# Patient Record
Sex: MALE | Race: WHITE | NOT HISPANIC OR LATINO | Employment: FULL TIME | URBAN - METROPOLITAN AREA
[De-identification: names, ages, dates, MRNs, and addresses within clinical notes are randomized per-mention and may not be internally consistent; named-entity substitution may affect disease eponyms.]

---

## 2024-02-20 ENCOUNTER — OFFICE VISIT (OUTPATIENT)
Dept: ORTHOPEDIC SURGERY | Facility: CLINIC | Age: 59
End: 2024-02-20
Payer: MEDICAID

## 2024-02-20 ENCOUNTER — HOSPITAL ENCOUNTER (OUTPATIENT)
Dept: RADIOLOGY | Facility: CLINIC | Age: 59
Discharge: HOME | End: 2024-02-20
Payer: MEDICAID

## 2024-02-20 DIAGNOSIS — M23.305 DEGENERATION OF MEDIAL MENISCUS: Primary | ICD-10-CM

## 2024-02-20 DIAGNOSIS — M25.561 CHRONIC PAIN OF RIGHT KNEE: ICD-10-CM

## 2024-02-20 DIAGNOSIS — G89.29 CHRONIC PAIN OF RIGHT KNEE: ICD-10-CM

## 2024-02-20 PROBLEM — S83.241A OTHER TEAR OF MEDIAL MENISCUS, CURRENT INJURY, RIGHT KNEE, INITIAL ENCOUNTER: Status: ACTIVE | Noted: 2024-02-20

## 2024-02-20 PROBLEM — S83.281A OTHER TEAR OF LATERAL MENISCUS, CURRENT INJURY, RIGHT KNEE, INITIAL ENCOUNTER: Status: ACTIVE | Noted: 2024-02-20

## 2024-02-20 PROCEDURE — 99214 OFFICE O/P EST MOD 30 MIN: CPT | Performed by: ORTHOPAEDIC SURGERY

## 2024-02-20 PROCEDURE — 73564 X-RAY EXAM KNEE 4 OR MORE: CPT | Mod: RT

## 2024-02-20 PROCEDURE — 73564 X-RAY EXAM KNEE 4 OR MORE: CPT | Mod: RIGHT SIDE | Performed by: ORTHOPAEDIC SURGERY

## 2024-02-20 NOTE — PROGRESS NOTES
History of present illness    58-year-old gentleman I seen in the remote past no significant right knee meniscal tearing has been having intermittent episodes of locking catching giving way over the last few weeks his symptoms be getting progressively worse he is having difficulty bearing weight difficulty stand for prolonged period time he has had an MRI in the past.  He is having more more swelling and disability related to his knee.      Past medical , Surgical, Family and social history reviewed.      Physical exam  General: No acute distress and breathing comfortably.  Patient is pleasant and cooperative with the examination.    Extremity  The affected knee was examined and inspected and was tender to the touch along the medial aspect with catching locking and mechanical symptoms.  The skin was intact without breakdown or open wound.  There was a positive Juan Ramon exam seen without gross evidence of instability in the collateral ligaments or in the anterior or posterior planes.  There was a negative Lachman's pivot shift and posterior drawer test with no foot drop numbness or tingling.  Sensation and reflexes in the foot and ankle are preserved.  There was an effusion. The patient had the ability to bear weight, but with discomfort.  The patient's gait was antalgic secondary to the discomfort. Range of motion showed good straight leg raise with ROM 0-115    Diagnostics      XR knee right 4+ views    Result Date: 2/20/2024  Interpreted By:  Dylan Sam, STUDY: XR KNEE RIGHT 4+ VIEWS;  ;  2/20/2024 9:01 am   INDICATION: Signs/Symptoms:pain.   ACCESSION NUMBER(S): [id number]   ORDERING CLINICIAN: DYLAN SAM   FINDINGS: Right knee weightbearing four views. Moderate lateral tilt of the patella with osteophyte formation mild knee arthritic changes with joint space narrowing mild knee effusion no signs of fracture dislocation or other bony abnormality     Signed by: Dylan  Flaco 2/20/2024 9:13 AM Dictation workstation:   [id number]       Procedure  [ none]    Assessment  Meniscal tearing knee right    Treatment plan  1.  The natural history of the condition and its associated treatment alternatives including surgical and nonsurgical options were discussed with the patient at length.  2.  Patient had previous MRI was diagnosed with medial and lateral meniscal tearing is try to hold off for as long as possible but symptoms seem to be getting progressively worse he is having significant impairment he like to proceed with knee arthroscopy.  The risk benefits treatment alternatives and postop course were discussed and he understands wishes to proceed.  3. [   ]  4.  All of the patient's questions were answered.    Orders Placed This Encounter    XR knee right 4+ views       This note was prepared using voice recognition software.  The details of this note are correct and have been reviewed, and corrected to the best of my ability.  Some grammatical areas may persist related to the Dragon software    Jamin Sam MD  Senior Attending Physician  [facility]  [facility] [place]    [phone]

## 2024-02-23 ENCOUNTER — HOSPITAL ENCOUNTER (OUTPATIENT)
Dept: CARDIOLOGY | Facility: HOSPITAL | Age: 59
Discharge: HOME | End: 2024-02-23
Payer: MEDICAID

## 2024-02-23 ENCOUNTER — APPOINTMENT (OUTPATIENT)
Dept: CARDIOLOGY | Facility: HOSPITAL | Age: 59
End: 2024-02-23
Payer: MEDICAID

## 2024-02-23 ENCOUNTER — LAB (OUTPATIENT)
Dept: LAB | Facility: HOSPITAL | Age: 59
End: 2024-02-23
Payer: MEDICAID

## 2024-02-23 DIAGNOSIS — S83.281A OTHER TEAR OF LATERAL MENISCUS, CURRENT INJURY, RIGHT KNEE, INITIAL ENCOUNTER: ICD-10-CM

## 2024-02-23 DIAGNOSIS — S83.241A OTHER TEAR OF MEDIAL MENISCUS, CURRENT INJURY, RIGHT KNEE, INITIAL ENCOUNTER: ICD-10-CM

## 2024-02-23 LAB
ALBUMIN SERPL BCP-MCNC: 4.2 G/DL (ref 3.4–5)
ALP SERPL-CCNC: 78 U/L (ref 33–120)
ALT SERPL W P-5'-P-CCNC: 20 U/L (ref 10–52)
ANION GAP SERPL CALC-SCNC: 9 MMOL/L (ref 10–20)
AST SERPL W P-5'-P-CCNC: 14 U/L (ref 9–39)
BASOPHILS # BLD AUTO: 0.06 X10*3/UL (ref 0–0.1)
BASOPHILS NFR BLD AUTO: 0.7 %
BILIRUB SERPL-MCNC: 0.3 MG/DL (ref 0–1.2)
BUN SERPL-MCNC: 16 MG/DL (ref 6–23)
CALCIUM SERPL-MCNC: 9.2 MG/DL (ref 8.6–10.3)
CHLORIDE SERPL-SCNC: 105 MMOL/L (ref 98–107)
CO2 SERPL-SCNC: 28 MMOL/L (ref 21–32)
CREAT SERPL-MCNC: 0.81 MG/DL (ref 0.5–1.3)
EGFRCR SERPLBLD CKD-EPI 2021: >90 ML/MIN/1.73M*2
EOSINOPHIL # BLD AUTO: 0.6 X10*3/UL (ref 0–0.7)
EOSINOPHIL NFR BLD AUTO: 7 %
ERYTHROCYTE [DISTWIDTH] IN BLOOD BY AUTOMATED COUNT: 13.2 % (ref 11.5–14.5)
GLUCOSE SERPL-MCNC: 96 MG/DL (ref 74–99)
HCT VFR BLD AUTO: 43.7 % (ref 41–52)
HGB BLD-MCNC: 14.4 G/DL (ref 13.5–17.5)
IMM GRANULOCYTES # BLD AUTO: 0.02 X10*3/UL (ref 0–0.7)
IMM GRANULOCYTES NFR BLD AUTO: 0.2 % (ref 0–0.9)
INR PPP: 0.9 (ref 0.9–1.1)
LYMPHOCYTES # BLD AUTO: 2.54 X10*3/UL (ref 1.2–4.8)
LYMPHOCYTES NFR BLD AUTO: 29.5 %
MCH RBC QN AUTO: 33 PG (ref 26–34)
MCHC RBC AUTO-ENTMCNC: 33 G/DL (ref 32–36)
MCV RBC AUTO: 100 FL (ref 80–100)
MONOCYTES # BLD AUTO: 0.73 X10*3/UL (ref 0.1–1)
MONOCYTES NFR BLD AUTO: 8.5 %
NEUTROPHILS # BLD AUTO: 4.66 X10*3/UL (ref 1.2–7.7)
NEUTROPHILS NFR BLD AUTO: 54.1 %
NRBC BLD-RTO: 0 /100 WBCS (ref 0–0)
PLATELET # BLD AUTO: 356 X10*3/UL (ref 150–450)
POTASSIUM SERPL-SCNC: 5 MMOL/L (ref 3.5–5.3)
PROT SERPL-MCNC: 7.2 G/DL (ref 6.4–8.2)
PROTHROMBIN TIME: 10.2 SECONDS (ref 9.8–12.8)
RBC # BLD AUTO: 4.36 X10*6/UL (ref 4.5–5.9)
SODIUM SERPL-SCNC: 137 MMOL/L (ref 136–145)
WBC # BLD AUTO: 8.6 X10*3/UL (ref 4.4–11.3)

## 2024-02-23 PROCEDURE — 80053 COMPREHEN METABOLIC PANEL: CPT

## 2024-02-23 PROCEDURE — 93010 ELECTROCARDIOGRAM REPORT: CPT | Performed by: INTERNAL MEDICINE

## 2024-02-23 PROCEDURE — 93005 ELECTROCARDIOGRAM TRACING: CPT

## 2024-02-23 PROCEDURE — 36415 COLL VENOUS BLD VENIPUNCTURE: CPT

## 2024-02-23 PROCEDURE — 85025 COMPLETE CBC W/AUTO DIFF WBC: CPT

## 2024-02-23 PROCEDURE — 85610 PROTHROMBIN TIME: CPT

## 2024-02-27 LAB
ATRIAL RATE: 61 BPM
P AXIS: 54 DEGREES
P OFFSET: 186 MS
P ONSET: 138 MS
PR INTERVAL: 170 MS
Q ONSET: 223 MS
QRS COUNT: 10 BEATS
QRS DURATION: 86 MS
QT INTERVAL: 392 MS
QTC CALCULATION(BAZETT): 394 MS
QTC FREDERICIA: 393 MS
R AXIS: 54 DEGREES
T AXIS: 51 DEGREES
T OFFSET: 419 MS
VENTRICULAR RATE: 61 BPM

## 2024-03-01 DIAGNOSIS — M23.305 DEGENERATION OF MEDIAL MENISCUS: ICD-10-CM

## 2024-03-01 DIAGNOSIS — G89.29 CHRONIC PAIN OF RIGHT KNEE: ICD-10-CM

## 2024-03-01 DIAGNOSIS — M25.561 CHRONIC PAIN OF RIGHT KNEE: ICD-10-CM

## 2024-03-01 PROBLEM — M25.569 KNEE PAIN: Status: ACTIVE | Noted: 2024-03-01

## 2024-03-01 PROBLEM — M23.90 INTERNAL DERANGEMENT OF KNEE: Status: RESOLVED | Noted: 2024-03-01 | Resolved: 2024-03-01

## 2024-03-01 RX ORDER — IBUPROFEN 800 MG/1
800 TABLET ORAL DAILY
COMMUNITY

## 2024-03-01 RX ORDER — BISMUTH SUBSALICYLATE 262 MG
1 TABLET,CHEWABLE ORAL DAILY
COMMUNITY

## 2024-03-01 NOTE — PREPROCEDURE INSTRUCTIONS
Reviewed medical history and current medications. Aware to hold ibuprofen. NPO after midnight. Patient verbalized understanding

## 2024-03-02 RX ORDER — OXYCODONE AND ACETAMINOPHEN 5; 325 MG/1; MG/1
1 TABLET ORAL EVERY 8 HOURS PRN
Qty: 20 TABLET | Refills: 0 | Status: SHIPPED | OUTPATIENT
Start: 2024-03-04 | End: 2024-03-11

## 2024-03-03 ASSESSMENT — ENCOUNTER SYMPTOMS
ARTHRALGIAS: 0
EYES NEGATIVE: 1
CONSTITUTIONAL NEGATIVE: 1
JOINT SWELLING: 0
CHEST TIGHTNESS: 0
CARDIOVASCULAR NEGATIVE: 1

## 2024-03-04 ENCOUNTER — ANESTHESIA (OUTPATIENT)
Dept: OPERATING ROOM | Facility: HOSPITAL | Age: 59
End: 2024-03-04
Payer: MEDICAID

## 2024-03-04 ENCOUNTER — ANESTHESIA EVENT (OUTPATIENT)
Dept: OPERATING ROOM | Facility: HOSPITAL | Age: 59
End: 2024-03-04
Payer: MEDICAID

## 2024-03-04 ENCOUNTER — HOSPITAL ENCOUNTER (OUTPATIENT)
Facility: HOSPITAL | Age: 59
Setting detail: OUTPATIENT SURGERY
Discharge: HOME | End: 2024-03-04
Attending: ORTHOPAEDIC SURGERY | Admitting: ORTHOPAEDIC SURGERY
Payer: MEDICAID

## 2024-03-04 VITALS
SYSTOLIC BLOOD PRESSURE: 114 MMHG | HEART RATE: 60 BPM | RESPIRATION RATE: 18 BRPM | BODY MASS INDEX: 33.89 KG/M2 | WEIGHT: 242.06 LBS | OXYGEN SATURATION: 95 % | TEMPERATURE: 96.8 F | DIASTOLIC BLOOD PRESSURE: 60 MMHG | HEIGHT: 71 IN

## 2024-03-04 DIAGNOSIS — S83.241A OTHER TEAR OF MEDIAL MENISCUS, CURRENT INJURY, RIGHT KNEE, INITIAL ENCOUNTER: Primary | ICD-10-CM

## 2024-03-04 DIAGNOSIS — S83.281A OTHER TEAR OF LATERAL MENISCUS, CURRENT INJURY, RIGHT KNEE, INITIAL ENCOUNTER: ICD-10-CM

## 2024-03-04 PROCEDURE — 2500000001 HC RX 250 WO HCPCS SELF ADMINISTERED DRUGS (ALT 637 FOR MEDICARE OP): Performed by: ANESTHESIOLOGY

## 2024-03-04 PROCEDURE — A4217 STERILE WATER/SALINE, 500 ML: HCPCS | Performed by: ORTHOPAEDIC SURGERY

## 2024-03-04 PROCEDURE — 7100000002 HC RECOVERY ROOM TIME - EACH INCREMENTAL 1 MINUTE: Performed by: ORTHOPAEDIC SURGERY

## 2024-03-04 PROCEDURE — 29880 ARTHRS KNE SRG MNISECTMY M&L: CPT | Performed by: ORTHOPAEDIC SURGERY

## 2024-03-04 PROCEDURE — 7100000009 HC PHASE TWO TIME - INITIAL BASE CHARGE: Performed by: ORTHOPAEDIC SURGERY

## 2024-03-04 PROCEDURE — 7100000001 HC RECOVERY ROOM TIME - INITIAL BASE CHARGE: Performed by: ORTHOPAEDIC SURGERY

## 2024-03-04 PROCEDURE — 2500000004 HC RX 250 GENERAL PHARMACY W/ HCPCS (ALT 636 FOR OP/ED): Performed by: ANESTHESIOLOGY

## 2024-03-04 PROCEDURE — 2500000005 HC RX 250 GENERAL PHARMACY W/O HCPCS: Performed by: ORTHOPAEDIC SURGERY

## 2024-03-04 PROCEDURE — 2500000005 HC RX 250 GENERAL PHARMACY W/O HCPCS

## 2024-03-04 PROCEDURE — 2500000004 HC RX 250 GENERAL PHARMACY W/ HCPCS (ALT 636 FOR OP/ED): Performed by: ORTHOPAEDIC SURGERY

## 2024-03-04 PROCEDURE — 3700000001 HC GENERAL ANESTHESIA TIME - INITIAL BASE CHARGE: Performed by: ORTHOPAEDIC SURGERY

## 2024-03-04 PROCEDURE — 3600000004 HC OR TIME - INITIAL BASE CHARGE - PROCEDURE LEVEL FOUR: Performed by: ORTHOPAEDIC SURGERY

## 2024-03-04 PROCEDURE — 2500000004 HC RX 250 GENERAL PHARMACY W/ HCPCS (ALT 636 FOR OP/ED)

## 2024-03-04 PROCEDURE — 2720000007 HC OR 272 NO HCPCS: Performed by: ORTHOPAEDIC SURGERY

## 2024-03-04 PROCEDURE — 7100000010 HC PHASE TWO TIME - EACH INCREMENTAL 1 MINUTE: Performed by: ORTHOPAEDIC SURGERY

## 2024-03-04 PROCEDURE — 3600000009 HC OR TIME - EACH INCREMENTAL 1 MINUTE - PROCEDURE LEVEL FOUR: Performed by: ORTHOPAEDIC SURGERY

## 2024-03-04 PROCEDURE — 3700000002 HC GENERAL ANESTHESIA TIME - EACH INCREMENTAL 1 MINUTE: Performed by: ORTHOPAEDIC SURGERY

## 2024-03-04 RX ORDER — BUPIVACAINE HYDROCHLORIDE 5 MG/ML
INJECTION, SOLUTION PERINEURAL AS NEEDED
Status: DISCONTINUED | OUTPATIENT
Start: 2024-03-04 | End: 2024-03-04 | Stop reason: HOSPADM

## 2024-03-04 RX ORDER — ACETAMINOPHEN 325 MG/1
650 TABLET ORAL EVERY 4 HOURS PRN
Status: DISCONTINUED | OUTPATIENT
Start: 2024-03-04 | End: 2024-03-04 | Stop reason: HOSPADM

## 2024-03-04 RX ORDER — MIDAZOLAM HYDROCHLORIDE 1 MG/ML
INJECTION, SOLUTION INTRAMUSCULAR; INTRAVENOUS AS NEEDED
Status: DISCONTINUED | OUTPATIENT
Start: 2024-03-04 | End: 2024-03-04

## 2024-03-04 RX ORDER — FENTANYL CITRATE 50 UG/ML
INJECTION, SOLUTION INTRAMUSCULAR; INTRAVENOUS AS NEEDED
Status: DISCONTINUED | OUTPATIENT
Start: 2024-03-04 | End: 2024-03-04

## 2024-03-04 RX ORDER — OXYCODONE HYDROCHLORIDE 5 MG/1
10 TABLET ORAL EVERY 4 HOURS PRN
Status: DISCONTINUED | OUTPATIENT
Start: 2024-03-04 | End: 2024-03-04 | Stop reason: HOSPADM

## 2024-03-04 RX ORDER — DEXAMETHASONE SODIUM PHOSPHATE 4 MG/ML
INJECTION, SOLUTION INTRA-ARTICULAR; INTRALESIONAL; INTRAMUSCULAR; INTRAVENOUS; SOFT TISSUE AS NEEDED
Status: DISCONTINUED | OUTPATIENT
Start: 2024-03-04 | End: 2024-03-04

## 2024-03-04 RX ORDER — PROPOFOL 10 MG/ML
INJECTION, EMULSION INTRAVENOUS AS NEEDED
Status: DISCONTINUED | OUTPATIENT
Start: 2024-03-04 | End: 2024-03-04

## 2024-03-04 RX ORDER — FENTANYL CITRATE 50 UG/ML
50 INJECTION, SOLUTION INTRAMUSCULAR; INTRAVENOUS EVERY 5 MIN PRN
Status: DISCONTINUED | OUTPATIENT
Start: 2024-03-04 | End: 2024-03-04 | Stop reason: HOSPADM

## 2024-03-04 RX ORDER — KETOROLAC TROMETHAMINE 30 MG/ML
INJECTION, SOLUTION INTRAMUSCULAR; INTRAVENOUS AS NEEDED
Status: DISCONTINUED | OUTPATIENT
Start: 2024-03-04 | End: 2024-03-04

## 2024-03-04 RX ORDER — ONDANSETRON HYDROCHLORIDE 2 MG/ML
INJECTION, SOLUTION INTRAVENOUS AS NEEDED
Status: DISCONTINUED | OUTPATIENT
Start: 2024-03-04 | End: 2024-03-04

## 2024-03-04 RX ORDER — SODIUM CHLORIDE 0.9 G/100ML
IRRIGANT IRRIGATION AS NEEDED
Status: DISCONTINUED | OUTPATIENT
Start: 2024-03-04 | End: 2024-03-04 | Stop reason: HOSPADM

## 2024-03-04 RX ORDER — MEPERIDINE HYDROCHLORIDE 25 MG/ML
12.5 INJECTION INTRAMUSCULAR; INTRAVENOUS; SUBCUTANEOUS EVERY 10 MIN PRN
Status: DISCONTINUED | OUTPATIENT
Start: 2024-03-04 | End: 2024-03-04 | Stop reason: HOSPADM

## 2024-03-04 RX ORDER — CEFAZOLIN SODIUM 2 G/100ML
2 INJECTION, SOLUTION INTRAVENOUS ONCE
Status: COMPLETED | OUTPATIENT
Start: 2024-03-04 | End: 2024-03-04

## 2024-03-04 RX ORDER — SODIUM CHLORIDE, SODIUM LACTATE, POTASSIUM CHLORIDE, CALCIUM CHLORIDE 600; 310; 30; 20 MG/100ML; MG/100ML; MG/100ML; MG/100ML
100 INJECTION, SOLUTION INTRAVENOUS CONTINUOUS
Status: DISCONTINUED | OUTPATIENT
Start: 2024-03-04 | End: 2024-03-04 | Stop reason: HOSPADM

## 2024-03-04 RX ORDER — SODIUM CHLORIDE, SODIUM LACTATE, POTASSIUM CHLORIDE, CALCIUM CHLORIDE 600; 310; 30; 20 MG/100ML; MG/100ML; MG/100ML; MG/100ML
50 INJECTION, SOLUTION INTRAVENOUS CONTINUOUS
Status: DISCONTINUED | OUTPATIENT
Start: 2024-03-04 | End: 2024-03-04 | Stop reason: HOSPADM

## 2024-03-04 RX ORDER — FENTANYL CITRATE 50 UG/ML
25 INJECTION, SOLUTION INTRAMUSCULAR; INTRAVENOUS EVERY 5 MIN PRN
Status: DISCONTINUED | OUTPATIENT
Start: 2024-03-04 | End: 2024-03-04 | Stop reason: HOSPADM

## 2024-03-04 RX ORDER — LIDOCAINE HYDROCHLORIDE 20 MG/ML
INJECTION, SOLUTION INFILTRATION; PERINEURAL AS NEEDED
Status: DISCONTINUED | OUTPATIENT
Start: 2024-03-04 | End: 2024-03-04

## 2024-03-04 RX ORDER — OXYCODONE HYDROCHLORIDE 5 MG/1
5 TABLET ORAL EVERY 4 HOURS PRN
Status: DISCONTINUED | OUTPATIENT
Start: 2024-03-04 | End: 2024-03-04 | Stop reason: HOSPADM

## 2024-03-04 RX ADMIN — MIDAZOLAM 2 MG: 1 INJECTION INTRAMUSCULAR; INTRAVENOUS at 07:30

## 2024-03-04 RX ADMIN — FENTANYL CITRATE 25 MCG: 50 INJECTION, SOLUTION INTRAMUSCULAR; INTRAVENOUS at 08:50

## 2024-03-04 RX ADMIN — DEXAMETHASONE SODIUM PHOSPHATE 4 MG: 4 INJECTION, SOLUTION INTRAMUSCULAR; INTRAVENOUS at 07:39

## 2024-03-04 RX ADMIN — FENTANYL CITRATE 25 MCG: 50 INJECTION, SOLUTION INTRAMUSCULAR; INTRAVENOUS at 07:53

## 2024-03-04 RX ADMIN — CEFAZOLIN SODIUM 2 G: 2 INJECTION, SOLUTION INTRAVENOUS at 07:38

## 2024-03-04 RX ADMIN — ONDANSETRON 4 MG: 2 INJECTION, SOLUTION INTRAMUSCULAR; INTRAVENOUS at 07:39

## 2024-03-04 RX ADMIN — FENTANYL CITRATE 25 MCG: 50 INJECTION, SOLUTION INTRAMUSCULAR; INTRAVENOUS at 08:38

## 2024-03-04 RX ADMIN — FENTANYL CITRATE 25 MCG: 50 INJECTION, SOLUTION INTRAMUSCULAR; INTRAVENOUS at 08:58

## 2024-03-04 RX ADMIN — FENTANYL CITRATE 25 MCG: 50 INJECTION, SOLUTION INTRAMUSCULAR; INTRAVENOUS at 07:34

## 2024-03-04 RX ADMIN — KETOROLAC TROMETHAMINE 30 MG: 30 INJECTION, SOLUTION INTRAMUSCULAR; INTRAVENOUS at 07:56

## 2024-03-04 RX ADMIN — FENTANYL CITRATE 25 MCG: 50 INJECTION, SOLUTION INTRAMUSCULAR; INTRAVENOUS at 08:43

## 2024-03-04 RX ADMIN — PROPOFOL 200 MG: 10 INJECTION, EMULSION INTRAVENOUS at 07:34

## 2024-03-04 RX ADMIN — LIDOCAINE HYDROCHLORIDE 100 MG: 20 INJECTION, SOLUTION INFILTRATION; PERINEURAL at 07:34

## 2024-03-04 RX ADMIN — SODIUM CHLORIDE, POTASSIUM CHLORIDE, SODIUM LACTATE AND CALCIUM CHLORIDE 50 ML/HR: 600; 310; 30; 20 INJECTION, SOLUTION INTRAVENOUS at 06:18

## 2024-03-04 RX ADMIN — OXYCODONE HYDROCHLORIDE 10 MG: 5 TABLET ORAL at 09:23

## 2024-03-04 ASSESSMENT — PAIN - FUNCTIONAL ASSESSMENT
PAIN_FUNCTIONAL_ASSESSMENT: 0-10
PAIN_FUNCTIONAL_ASSESSMENT: UNABLE TO SELF-REPORT
PAIN_FUNCTIONAL_ASSESSMENT: 0-10
PAIN_FUNCTIONAL_ASSESSMENT: 0-10

## 2024-03-04 ASSESSMENT — PAIN DESCRIPTION - ORIENTATION: ORIENTATION: RIGHT

## 2024-03-04 ASSESSMENT — PAIN SCALES - GENERAL
PAINLEVEL_OUTOF10: 0 - NO PAIN
PAINLEVEL_OUTOF10: 0 - NO PAIN
PAINLEVEL_OUTOF10: 5 - MODERATE PAIN
PAINLEVEL_OUTOF10: 5 - MODERATE PAIN
PAINLEVEL_OUTOF10: 0 - NO PAIN
PAINLEVEL_OUTOF10: 7
PAINLEVEL_OUTOF10: 0 - NO PAIN
PAINLEVEL_OUTOF10: 6
PAINLEVEL_OUTOF10: 3

## 2024-03-04 ASSESSMENT — PAIN DESCRIPTION - LOCATION: LOCATION: KNEE

## 2024-03-04 ASSESSMENT — COLUMBIA-SUICIDE SEVERITY RATING SCALE - C-SSRS
2. HAVE YOU ACTUALLY HAD ANY THOUGHTS OF KILLING YOURSELF?: NO
1. IN THE PAST MONTH, HAVE YOU WISHED YOU WERE DEAD OR WISHED YOU COULD GO TO SLEEP AND NOT WAKE UP?: NO
6. HAVE YOU EVER DONE ANYTHING, STARTED TO DO ANYTHING, OR PREPARED TO DO ANYTHING TO END YOUR LIFE?: NO

## 2024-03-04 ASSESSMENT — PAIN SCALES - WONG BAKER: WONGBAKER_NUMERICALRESPONSE: HURTS WHOLE LOT

## 2024-03-04 NOTE — ANESTHESIA PREPROCEDURE EVALUATION
"Hari Gould is a 58 y.o. male here for:      Meniscectomy Arthroscopy Knee  With Jamin Sam MD  Pre-Op Diagnosis Codes:     * Other tear of medial meniscus, current injury, right knee, initial encounter [S83.241A]     * Other tear of lateral meniscus, current injury, right knee, initial encounter [S83.281A]    Lab Results   Component Value Date    HGB 14.4 02/23/2024    HCT 43.7 02/23/2024    WBC 8.6 02/23/2024     02/23/2024     02/23/2024    K 5.0 02/23/2024     02/23/2024    CREATININE 0.81 02/23/2024    BUN 16 02/23/2024       Social History     Substance and Sexual Activity   Drug Use Never      Tobacco Use: High Risk (3/4/2024)    Patient History     Smoking Tobacco Use: Every Day     Smokeless Tobacco Use: Current     Passive Exposure: Not on file      Social History     Substance and Sexual Activity   Alcohol Use Yes    Comment: rarely        No Known Allergies    Current Outpatient Medications   Medication Instructions    ibuprofen 800 mg, oral, Daily    multivitamin tablet 1 tablet, oral, Daily    oxyCODONE-acetaminophen (Percocet) 5-325 mg tablet 1 tablet, oral, Every 8 hours PRN       Past Medical History:   Diagnosis Date    Arthritis     Back pain     COPD (chronic obstructive pulmonary disease) (CMS/[facility])     Snores     Unspecified abnormalities of breathing 08/11/2020    Breathing problem    Unspecified visual loss 08/11/2020    Vision problems    Wears dentures     upper denture    Wears glasses     reading       Past Surgical History:   Procedure Laterality Date    APPENDECTOMY      CATARACT EXTRACTION, BILATERAL      HERNIA REPAIR      MULTIPLE TOOTH EXTRACTIONS         No family history on file.    Relevant Problems   No relevant active problems       Visit Vitals  /78   Temp 36.3 °C (97.3 °F) (Temporal)   Resp 18   Ht 1.803 m (5' 11\")   Wt 110 kg (242 lb 1 oz)   SpO2 95%   BMI 33.76 kg/m²   Smoking Status Every Day   BSA 2.35 m²       NPO Details:  NPO/Void " Status  Carbohydrate Drink Given Prior to Surgery? : N  Date of Last Liquid: 03/03/24  Time of Last Liquid: 2200  Date of Last Solid: 03/03/24  Time of Last Solid: 2000  Last Intake Type: Clear fluids  Time of Last Void: 0621        Physical Exam    Airway  Mallampati: II  TM distance: >3 FB     Cardiovascular - normal exam     Dental   (+) lower dentures, upper dentures     Pulmonary - normal exam     Abdominal   (+) obese  Abdomen: soft             Anesthesia Plan    History of general anesthesia?: yes  History of complications of general anesthesia?: no    ASA 3     general     intravenous induction   Anesthetic plan and risks discussed with patient.    Plan discussed with CRNA.

## 2024-03-04 NOTE — ANESTHESIA POSTPROCEDURE EVALUATION
Patient: Hari Gould    Procedure Summary       Date: 03/04/24 Room / Location: [facility] / Virtual [facility]    Anesthesia Start: 0730 Anesthesia Stop: 0808    Procedure: Meniscectomy Arthroscopy Knee (Right: Knee) Diagnosis:       Other tear of medial meniscus, current injury, right knee, initial encounter      Other tear of lateral meniscus, current injury, right knee, initial encounter      (Other tear of medial meniscus, current injury, right knee, initial encounter [S83.241A])      (Other tear of lateral meniscus, current injury, right knee, initial encounter [S83.281A])    Surgeons: Jamin Sam MD Responsible Provider: Sam Gibson MD    Anesthesia Type: general ASA Status: 3            Anesthesia Type: general    Vitals Value Taken Time   /67 03/04/24 0807   Temp 36.1 03/04/24 0810   Pulse 66 03/04/24 0808   Resp 17 03/04/24 0808   SpO2 99 % 03/04/24 0808   Vitals shown include unvalidated device data.    Anesthesia Post Evaluation    Patient location during evaluation: PACU  Patient participation: complete - patient participated  Level of consciousness: awake  Pain management: adequate  Multimodal analgesia pain management approach  Airway patency: patent  Cardiovascular status: acceptable  Respiratory status: acceptable  Hydration status: acceptable  Postoperative Nausea and Vomiting: none        No notable events documented.

## 2024-03-04 NOTE — DISCHARGE INSTRUCTIONS
General Anesthesia Discharge Instructions    About this topic  You may need general anesthesia if you need to be asleep during a procedure. Your doctor will use drugs to block the signals that go from your nerves to your brain. Doctors give general anesthesia during a surgery or procedure to:  Allow you to sleep  Help your body be still  Relax your muscles  Help you to relax and be pain free  Keep you from remembering the surgery  Let the doctor manage your airway, breathing, and blood flow  The doctor or nurse anesthetist gives general anesthesia by a shot into your vein. Sometimes, you may breathe in a gas through a mask placed over your face.  What care is needed at home?  Ask your doctor what you need to do when you go home. Make sure you ask questions if you do not understand what the doctor says.  Your doctor may give you drugs to prevent or treat an upset stomach from the anesthetic. Take them as ordered.  If your throat is sore, suck on ice chips or popsicles to ease throat pain.  Put 2 to 3 pillows under your head and back when you lie down to help you breathe easier.  For the first 24 to 48 hours:  Do not operate heavy or dangerous machinery.  Do not make major decisions or sign important papers. You may not be able to think clearly.  Avoid beer, wine, or mixed drinks.  You are at a higher risk of falling for at least 24 hours after general anesthesia.  Take extra care when you get up.  Do not change positions quickly.  Do not rush when you need to go to the bathroom or to answer the phone.  Ask for help if you feel unsteady when you try to walk.  Wear shoes with non-slip soles and low heels.  What follow-up care is needed?  Your doctor may ask you to come back to the office to check on your progress. Be sure to keep these visits.  If you have stitches that do not dissolve or staples, you will need to have them removed. Your doctor will want to do this in 1 to 2 weeks. If the doctor used skin glue, the  glue will fall off on its own.  What drugs may be needed?  The doctor may order drugs to:  Help with pain  Treat an upset stomach or throwing up  Will physical activity be limited?  You will not be allowed to drive right away after the procedure. Ask a family member or a friend to drive you home.  Avoid trying to get out of bed without help until you are sure of your balance.  You may have to limit your activity. Talk to your doctor about if you need to limit how much you lift or limit exercise after your procedure.  What changes to diet are needed?  Start with a light diet when you are fully awake. This includes things that are easy to swallow like soups, pudding, jello, toast, and eggs. Slowly progress to your normal diet.  What problems could happen?  Low blood pressure  Breathing problems  Upset stomach or throwing up  Dizziness  Blood clots  Infection  When do I need to call the doctor?  Trouble breathing  Upset stomach or throwing up more than 3 times in the next 2 days  Dizziness  Teach Back: Helping You Understand  The Teach Back Method helps you understand the information we are giving you. After you talk with the staff, tell them in your own words what you learned. This helps to make sure the staff has described each thing clearly. It also helps to explain things that may have been confusing. Before going home, make sure you can do these:  I can tell you about my procedure.  I can tell you if I need to follow up with my doctor.  I can tell you what is good for me to eat and drink the next day.  I can tell you what I would do if I have trouble breathing, an upset stomach, or dizziness.  Where can I learn more?  [employer]  https://www.nigms.nih.gov/education/pages/factsheet_Anesthesia.aspx  NHS Choices  http://www.nhs.uk/conditions/Anaesthetic-general/Pages/Definition.aspx  Last Reviewed Date  2020-04-22

## 2024-03-04 NOTE — ANESTHESIA PROCEDURE NOTES
Airway  Date/Time: 3/4/2024 7:35 AM  Urgency: elective    Airway not difficult    Staffing  Performed: CRNA   Authorized by: Sam Gibson MD    Performed by: MARCELLA Haddad-CRNA, DNAP  Patient location during procedure: OR    Indications and Patient Condition  Indications for airway management: anesthesia  Spontaneous ventilation: present  Sedation level: minimal  Preoxygenated: yes  Patient position: sniffing  Mask difficulty assessment: 0 - not attempted  Planned trial extubation    Final Airway Details  Final airway type: supraglottic airway      Successful airway: Size 5     Number of attempts at approach: 1

## 2024-03-04 NOTE — INTERVAL H&P NOTE
History and physical is reviewed, patient re evaluated, no changes.  Procedure, risks, benefits, and postoperative course were discussed with the patient and consent is reviewed.    Jamin Sam MD

## 2024-03-04 NOTE — H&P
History Of Present Illness  Hari Gould is a 58 y.o. male presenting with right knee pain.  He has been dealing with chronic right knee pain and difficulty with weight bearing activity.  He has had trouble with going up and down stairs.  He has experienced mechanical symptoms without much relief with numerous conservative treatment attempts.     Past Medical History  He has a past medical history of Arthritis, Back pain, COPD (chronic obstructive pulmonary disease) (CMS/[facility]), Snores, Unspecified abnormalities of breathing (08/11/2020), Unspecified visual loss (08/11/2020), Wears dentures, and Wears glasses.    Surgical History  He has a past surgical history that includes Appendectomy; Cataract extraction, bilateral; Hernia repair; and Multiple tooth extractions.     Social History  He reports that he has been smoking cigarettes. He has a 60.00 pack-year smoking history. His smokeless tobacco use includes chew. He reports current alcohol use. He reports that he does not use drugs.    Family History  No family history on file.     Allergies  Patient has no known allergies.    Review of Systems   Constitutional: Negative.    HENT: Negative.     Eyes: Negative.    Respiratory:  Negative for chest tightness.    Cardiovascular: Negative.  Negative for chest pain.   Musculoskeletal:  Negative for arthralgias and joint swelling.        Physical Exam  Constitutional:       Appearance: Normal appearance. He is normal weight.   HENT:      Head: Normocephalic and atraumatic.      Right Ear: External ear normal.      Left Ear: External ear normal.      Nose: Nose normal.      Mouth/Throat:      Mouth: Mucous membranes are moist.      Pharynx: Oropharynx is clear.   Eyes:      Extraocular Movements: Extraocular movements intact.      Conjunctiva/sclera: Conjunctivae normal.   Cardiovascular:      Pulses: Normal pulses.      Heart sounds: Normal heart sounds.   Pulmonary:      Effort: Pulmonary effort is normal.      Breath  "sounds: Normal breath sounds.   Abdominal:      General: Abdomen is flat.      Palpations: Abdomen is soft.   Musculoskeletal:      Cervical back: Normal range of motion and neck supple.      Comments: Right knee is NVI. No infection. Mild edema. No deformity. TTP medial jointline and patellofemoral compartment. Mild crepitus. No DVT. Pulses 2+   Skin:     General: Skin is warm and dry.   Neurological:      Mental Status: He is alert.       Last Recorded Vitals  Height 1.803 m (5' 11\"), weight 113 kg (250 lb 3.6 oz).    Relevant Results      Scheduled medications    Continuous medications    PRN medications    No results found for this or any previous visit (from the past 24 hour(s)).    Assessment/Plan   Active Problems:    Other tear of medial meniscus, current injury, right knee, initial encounter    Other tear of lateral meniscus, current injury, right knee, initial encounter    Right knee arthroscopic partial medial and lateral menisectomy       I spent 15 minutes in the professional and overall care of this patient.      Clemente Portillo PA-C    "

## 2024-03-04 NOTE — OP NOTE
Meniscectomy Arthroscopy Knee (R) Operative Note     Date: 3/4/2024  OR Location: [facility]    Name: Hari Gould, : 1965, Age: 58 y.o., MRN: [id number], Sex: male    Diagnosis  Pre-op Diagnosis     * Other tear of medial meniscus, current injury, right knee, initial encounter [S83.241A]     * Other tear of lateral meniscus, current injury, right knee, initial encounter [S83.281A] Post-op Diagnosis     * Other tear of medial meniscus, current injury, right knee, initial encounter [S83.241A]     * Other tear of lateral meniscus, current injury, right knee, initial encounter [S83.281A]     Procedures  Meniscectomy Arthroscopy Knee  [address] - [place] ARTHRS KNEE W/MENISCECTOMY MED&LAT W/SHAVING      Surgeons      * Jamin Sam - Primary    Resident/Fellow/Other Assistant:  Surgeon(s) and Role:     * Clemente Portillo PA-C - Assisting    Procedure Summary  Anesthesia: General  ASA: III  Anesthesia Staff: Anesthesiologist: Sam Gibson MD  CRNA: MARCELLA Haddad-CRNA, DNAP  Estimated Blood Loss: minmL  Intra-op Medications:   Administrations occurring from 0730 to 0800 on 24:   Medication Name Total Dose   BUPivacaine HCl (Marcaine) 0.5 % (5 mg/mL) injection 30 mL   sodium chloride 0.9 % irrigation solution 6,000 mL   ceFAZolin in dextrose (iso-os) (Ancef) IVPB 2 g 2 g              Anesthesia Record               Intraprocedure I/O Totals          Intake    ceFAZolin in dextrose (iso-os) (Ancef) IVPB 2 g 100.00 mL    Total Intake 100 mL          Specimen: No specimens collected     Staff:   Circulator: Mohan Ramey RN  Scrub Person: Veronika Hendrickson         Drains and/or Catheters: * None in log *    Tourniquet Times:         Implants:     Findings: see procedure details    Indications: Hari Gould is an 58 y.o. male who is having surgery for Other tear of medial meniscus, current injury, right knee, initial encounter [S83.241A]  Other tear of lateral meniscus, current injury, right knee, initial  encounter [S83.972A].     The patient was seen in the preoperative area. The risks, benefits, complications, treatment options, non-operative alternatives, expected recovery and outcomes were discussed with the patient. The possibilities of reaction to medication, pulmonary aspiration, injury to surrounding structures, bleeding, recurrent infection, the need for additional procedures, failure to diagnose a condition, and creating a complication requiring transfusion or operation were discussed with the patient. The patient concurred with the proposed plan, giving informed consent.  The site of surgery was properly noted/marked if necessary per policy. The patient has been actively warmed in preoperative area. Preoperative antibiotics have been ordered and given within 1 hours of incision. Venous thrombosis prophylaxis have been ordered.    Procedure Details:   Preoperative diagnosis medial and lateral meniscal tear right knee  Postoperative diagnosis same  Procedure right knee arthroscopy arthroscopic partial medial and lateral meniscectomy    Anesthesia Gen.  Blood loss less than 10 cc    Primary surgeon Jamin Sam M.D.  Assisting Clemente MILES certified    Outerbridge classification  Medial 2  lateral2  Patellofemoral2      The physician assistant was present through the entire case.  Given the nature of the disease process and the procedure to be performed skilled surgical first assistant was necessary during the case.  The assistant was necessary to hold retractors and manipulate the extremity during the procedure.  A certified scrub tech was at the back table managing the instruments and supplies for the surgical case.    Prior to taking the patient to the operating room the surgical site was marked the H&P was reviewed updated in signed and patient received appropriate preoperative antibiotics    The patient has had problems with the knee that have failed to respond to conservative treatment, and the  patient presents today for knee arthroscopy the procedure its risks, potential complications and treatment alternatives were discussed with the patient who understood and wished to proceed    The patient was taken to the operating room and placed on the table in supine position where upon adequate general anesthesia was then obtained.  The patient was then prepped and draped in the usual sterile manner.  A surgical timeout was then performed and the site marking was rechecked.  Standard anteromedial and anterolateral joint line portals were made using an 11 blade for the skin and the blunt trochars.  A systematic evaluation of the knee joint was then performed.    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  The punch and the tissue ablation device][approximate percent of meniscus removed]10 there was evidence of an old peripheral meniscal tear which had healed there is no displaceable flap just degenerative tearing along the internal margin    ACL and PCL were then inspected[and probed and stable without abnormality]    Finally, the knee joint was irrigated with a copious amount of saline irrigation through the portals.  An the portals were closed using 4-0 nylon suture.  The knee joint was infiltrated with 30 cc of half percent Marcaine.  A dry sterile bulky dressing was applied.  The patient tolerated the procedure well and was taken to the recovery room in good condition without complication to be discharged home same day.    This note was  prepared using voice recognition software.  The details of this note are correct and have been reviewed, and corrected to the best of my ability.  Some grammatical areas may persist related to the Dragon software    Jamin Sam MD    [phone]    Complications:  None; patient tolerated the procedure well.    Disposition: PACU - hemodynamically stable.  Condition: stable         Jamin Sam  Phone Number: [phone]

## 2024-03-06 ENCOUNTER — OFFICE VISIT (OUTPATIENT)
Dept: ORTHOPEDIC SURGERY | Facility: CLINIC | Age: 59
End: 2024-03-06
Payer: MEDICAID

## 2024-03-06 DIAGNOSIS — M23.305 DEGENERATION OF MEDIAL MENISCUS: Primary | ICD-10-CM

## 2024-03-06 PROCEDURE — 99024 POSTOP FOLLOW-UP VISIT: CPT | Performed by: ORTHOPAEDIC SURGERY

## 2024-03-06 NOTE — PROGRESS NOTES
Subjective    Patient ID: Hari    Chief Complaint: No chief complaint on file.    History of present illness    58-year-old gentleman presented clinic today for follow-up evaluation status post right knee arthroscopic partial medial and lateral meniscectomies performed 3/4/2024.  He is ambulating without assistance.  He states he feels much better at this time.  Not really taking pain medication at this time.  He has been continuing ice.  He states that he has overdone it a few times but is managing his flareup very well.  No longer having mechanical symptoms at this time.      Past medical , Surgical, Family and social history reviewed.      Physical exam  General: No acute distress and breathing comfortably.  Patient is pleasant and cooperative with the examination.    Extremity  Right knee is neurovascular intact.  Good range of motion.  Moderate edema right knee.  Incisions are well-healed well-approximated.  Wounds are clean dry and intact.  Portal sites within normal limits.  No calf swelling or tenderness at this time.  Compartments soft.  No ecchymosis or erythema seen.  No DVT.    Diagnostics  [ none]  ECG 12 Lead    Result Date: 2/27/2024  Normal sinus rhythm Normal ECG No previous ECGs available Confirmed by Maco Cheek ([id number]) on 2/27/2024 2:23:52 PM    XR knee right 4+ views    Result Date: 2/20/2024  Interpreted By:  Dylan Sam, STUDY: XR KNEE RIGHT 4+ VIEWS;  ;  2/20/2024 9:01 am   INDICATION: Signs/Symptoms:pain.   ACCESSION NUMBER(S): [id number]   ORDERING CLINICIAN: DYLAN SAM   FINDINGS: Right knee weightbearing four views. Moderate lateral tilt of the patella with osteophyte formation mild knee arthritic changes with joint space narrowing mild knee effusion no signs of fracture dislocation or other bony abnormality     Signed by: Dylan Sam 2/20/2024 9:13 AM Dictation workstation:   [id number]       Procedure  She was removed Steri-Strips placed without complication  over the portal sites    Assessment  Status post right knee arthroscopic partial medial and lateral meniscectomy    Treatment plan  1.  Wound instructions were discussed with patient.  2.  He was encouraged to knee with weightbearing activity as tolerated.  Prescription for outpatient physical therapy was given.  Will continue with stretching at home.  3.  He will follow-up with us in 2 weeks for reevaluation without x-ray possible final check at that time.  4.  All of the patient's questions were answered.    This note was prepared using voice recognition software.  The details of this note are correct and have been reviewed, and corrected to the best of my ability.  Some grammatical areas may persist related to the Dragon software    Clemente Portillo PA-C, [facility]  [facility]  [facility] [place]    [phone]

## 2024-03-19 ENCOUNTER — OFFICE VISIT (OUTPATIENT)
Dept: ORTHOPEDIC SURGERY | Facility: CLINIC | Age: 59
End: 2024-03-19
Payer: MEDICAID

## 2024-03-19 DIAGNOSIS — M23.305 DEGENERATION OF MEDIAL MENISCUS: Primary | ICD-10-CM

## 2024-03-19 PROCEDURE — 99024 POSTOP FOLLOW-UP VISIT: CPT | Performed by: ORTHOPAEDIC SURGERY

## 2024-03-19 NOTE — PROGRESS NOTES
History of present illness    58-year-old woman here for follow-up of his right knee had a right knee arthroscopy done 3/4/2024 states doing very well states he went for 1 visit of physical therapy and they essentially kicked him out he is ambulate without assistive ice no numbness or tingling no fevers or chills no locking giving way just a little achy if he squats or kneels      Past medical , Surgical, Family and social history reviewed.      Physical exam  General: No acute distress and breathing comfortably.  Patient is pleasant and cooperative with the examination.    Extremity  Port is well-healed no sign infection good range of motion neurologic intact distally brisk cap refill compartments soft calves nontender    Diagnostics      ECG 12 Lead    Result Date: 2/27/2024  Normal sinus rhythm Normal ECG No previous ECGs available Confirmed by Maco Cheek ([id number]) on 2/27/2024 2:23:52 PM    XR knee right 4+ views    Result Date: 2/20/2024  Interpreted By:  Dylan Sam, STUDY: XR KNEE RIGHT 4+ VIEWS;  ;  2/20/2024 9:01 am   INDICATION: Signs/Symptoms:pain.   ACCESSION NUMBER(S): [id number]   ORDERING CLINICIAN: DYLAN SAM   FINDINGS: Right knee weightbearing four views. Moderate lateral tilt of the patella with osteophyte formation mild knee arthritic changes with joint space narrowing mild knee effusion no signs of fracture dislocation or other bony abnormality     Signed by: Dylan Sam 2/20/2024 9:13 AM Dictation workstation:   [id number]       Procedure  [ none]    Assessment  Status post right knee    Treatment plan  1.  Continue working on range of motion and strengthening emphasized that he should continue to take it easy for the next 3 to 4 weeks while things continue to heal left continue with his oral anti-inflammatory medication as needed and follow-up with me in a month if he is having symptoms otherwise.prn  2. [   ]  3. [   ]  4.  All of the patient's  questions were answered.    No orders of the defined types were placed in this encounter.      This note was prepared using voice recognition software.  The details of this note are correct and have been reviewed, and corrected to the best of my ability.  Some grammatical areas may persist related to the Dragon software    Jamin Sam MD  Senior Attending Physician  [facility]  [facility] [place]    [phone]

## 2024-04-22 ENCOUNTER — OFFICE VISIT (OUTPATIENT)
Dept: PRIMARY CARE | Facility: CLINIC | Age: 59
End: 2024-04-22
Payer: MEDICAID

## 2024-04-22 ENCOUNTER — TELEPHONE (OUTPATIENT)
Dept: PRIMARY CARE | Facility: CLINIC | Age: 59
End: 2024-04-22

## 2024-04-22 VITALS
OXYGEN SATURATION: 96 % | HEIGHT: 71 IN | RESPIRATION RATE: 16 BRPM | SYSTOLIC BLOOD PRESSURE: 110 MMHG | WEIGHT: 250 LBS | TEMPERATURE: 97.6 F | DIASTOLIC BLOOD PRESSURE: 60 MMHG | HEART RATE: 70 BPM | BODY MASS INDEX: 35 KG/M2

## 2024-04-22 DIAGNOSIS — I10 HYPERTENSION, UNSPECIFIED TYPE: ICD-10-CM

## 2024-04-22 DIAGNOSIS — R55 SYNCOPE, UNSPECIFIED SYNCOPE TYPE: Primary | ICD-10-CM

## 2024-04-22 DIAGNOSIS — R53.83 MALAISE AND FATIGUE: ICD-10-CM

## 2024-04-22 DIAGNOSIS — Z13.220 SCREENING CHOLESTEROL LEVEL: ICD-10-CM

## 2024-04-22 DIAGNOSIS — Z12.5 SCREENING PSA (PROSTATE SPECIFIC ANTIGEN): ICD-10-CM

## 2024-04-22 DIAGNOSIS — R53.81 MALAISE AND FATIGUE: ICD-10-CM

## 2024-04-22 DIAGNOSIS — M25.569 KNEE PAIN, UNSPECIFIED CHRONICITY, UNSPECIFIED LATERALITY: ICD-10-CM

## 2024-04-22 PROCEDURE — 3008F BODY MASS INDEX DOCD: CPT | Performed by: FAMILY MEDICINE

## 2024-04-22 PROCEDURE — 3078F DIAST BP <80 MM HG: CPT | Performed by: FAMILY MEDICINE

## 2024-04-22 PROCEDURE — 3074F SYST BP LT 130 MM HG: CPT | Performed by: FAMILY MEDICINE

## 2024-04-22 PROCEDURE — 99213 OFFICE O/P EST LOW 20 MIN: CPT | Performed by: FAMILY MEDICINE

## 2024-04-22 ASSESSMENT — PATIENT HEALTH QUESTIONNAIRE - PHQ9
SUM OF ALL RESPONSES TO PHQ9 QUESTIONS 1 AND 2: 0
1. LITTLE INTEREST OR PLEASURE IN DOING THINGS: NOT AT ALL
2. FEELING DOWN, DEPRESSED OR HOPELESS: NOT AT ALL

## 2024-04-22 NOTE — TELEPHONE ENCOUNTER
Patient wants his wife to be called (on HIPAA) and left voicemail. Blood work is printed out and in filing cabinet.

## 2024-04-22 NOTE — PROGRESS NOTES
Subjective   Patient ID: Hari Gould is a 58 y.o. male who presents for Establish Care.    HPI    Patient presents today to establish care.  Last PCP was Dr Patterson   Last saw PCP 20 years   [place] about us through pt son come here       Patient presents today for Est Care     Reviewed his chart including blood count and labs from February which was before his event//also EKG was unremarkable    Pt is accompany by his wife   Pt states was very faint, no color, no appetite, an very shaky x 1 month ago   Pt states symptom lasted couple of hours this happened on Easter has not happened for 1 month it was that day that it occurred,, he states it really has not happened since was not sure if he was having anxiety moment or his sugar was low as he did not eat breakfast that morning does smoke did drink Pepsi which he does a lot of we discussed importance of staying away from diabetes and also losing weight if he would just give up some of that    He had no chest pain with it no shortness of breath with that no dysuria with it did not lose bowel or bladder was aware of the whole situation      Wife states his color just went very white as if he was going to pass out but did not      No black stool no blood in his stool no carbadox or new exposures for as 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            No cad family  .., dad leukemia and passed away we discussed his February blood count showing no signs of leukemia        Pt refuse colonoscopy or cologurad//as at this time did not have health insurance    No other concern /question        Review of systems  ; Patient seen today for exam denies any problems with headaches or vision, denies any shortness of breath chest pain nausea or vomiting, no black stool no blood in the stool no heartburn type symptoms denies any problems with constipation or diarrhea, and no dysuria-type symptoms    The patient's allergies medications were reviewed with them today    The  "patient's social family and surgical history or also reviewed here today, along with her past medical history.     Objective     Alert and active in  no acute distress  HEENT TMs clear oropharynx negative nares clear no drainage noted neck supple  With no adenopathy   Heart regular rate and rhythm without murmur and no carotid bruits  Lungs- clear to auscultation bilaterally, no wheeze or rhonchi noted  Thyroid -negative masses or nodularity  Abdomen- soft times four quadrants , bowel sounds positive no masses or organomegaly, negative tenderness guarding or rebound  Neurological exam unremarkable- DTRs in upper and lower extremities within normal limits.   skin -no lesions noted      /60 (BP Location: Left arm, Patient Position: Sitting, BP Cuff Size: Large adult)   Pulse 70   Temp 36.4 °C (97.6 °F) (Temporal)   Resp 16   Ht 1.803 m (5' 11\")   Wt 113 kg (250 lb)   SpO2 96%   BMI 34.87 kg/m²     No Known Allergies    Assessment/Plan   Problem List Items Addressed This Visit       Knee pain    BMI 34.0-34.9,adult    Screening PSA (prostate specific antigen)     Other Visit Diagnoses       Syncope, unspecified syncope type    -  Primary    Screening cholesterol level        Malaise and fatigue        Relevant Orders    Comprehensive Metabolic Panel    CBC and Auto Differential    Vitamin B12    Tsh With Reflex To Free T4 If Abnormal    Hypertension, unspecified type        Relevant Orders    CT cardiac scoring wo IV contrast        I probably described this as a may be a low sugar, made him feel like he was going to pass out but did not possibly panic him      He smokes he is high risk for heart disease even though does not have a family history//he will go ahead and try to eat small meals more often try to cut down his Pepsi    Will repeat his blood test at [facility]      Will get a CT cardiac score to give us more information regarding his arteries and also his chest is a smoker  and wife understand " very grateful      Failure this would happen in the future go to the emergency room at once      If anything worsens or changes please call us at once, follow up in the office as planned,

## 2024-04-22 NOTE — TELEPHONE ENCOUNTER
CT CARDIAC SCORING AND BLOOD WORK    Patient was just seen. Are they supposed to have a ct cardiac scoring as well as bloodwork into the chart?     Please advise and thank you

## 2024-05-01 NOTE — PROGRESS NOTES
Steve Manzo, DO Mechelle Chan MA  Give the family a call, I think his wife called labs look okay sugar was only slightly elevated but B12 thyroid everything else was pretty okay and normal,, white blood cell count was up slightly which can be from inflammation also has some allergies it looks like going on,,, follow back up after get the testing we did, but nothing too concerning or a reason for his event that he had          Spoke with [place] (on HIPAA), patient's wife, she is aware of results and will schedule a follow up appointment.

## 2024-06-01 ENCOUNTER — HOSPITAL ENCOUNTER (OUTPATIENT)
Dept: RADIOLOGY | Facility: CLINIC | Age: 59
Discharge: HOME | End: 2024-06-01
Payer: MEDICAID

## 2024-06-01 DIAGNOSIS — I10 HYPERTENSION, UNSPECIFIED TYPE: ICD-10-CM

## 2024-06-01 PROCEDURE — 75571 CT HRT W/O DYE W/CA TEST: CPT

## 2024-06-05 ENCOUNTER — OFFICE VISIT (OUTPATIENT)
Dept: PRIMARY CARE | Facility: CLINIC | Age: 59
End: 2024-06-05
Payer: MEDICAID

## 2024-06-05 ENCOUNTER — TELEPHONE (OUTPATIENT)
Dept: PRIMARY CARE | Facility: CLINIC | Age: 59
End: 2024-06-05

## 2024-06-05 VITALS
SYSTOLIC BLOOD PRESSURE: 120 MMHG | OXYGEN SATURATION: 96 % | BODY MASS INDEX: 35.14 KG/M2 | HEIGHT: 71 IN | RESPIRATION RATE: 16 BRPM | WEIGHT: 251 LBS | HEART RATE: 82 BPM | DIASTOLIC BLOOD PRESSURE: 58 MMHG | TEMPERATURE: 97.4 F

## 2024-06-05 DIAGNOSIS — Z12.5 SCREENING PSA (PROSTATE SPECIFIC ANTIGEN): ICD-10-CM

## 2024-06-05 DIAGNOSIS — R93.1 HIGH CORONARY ARTERY CALCIUM SCORE: ICD-10-CM

## 2024-06-05 DIAGNOSIS — M54.2 NECK PAIN: ICD-10-CM

## 2024-06-05 DIAGNOSIS — M54.50 CHRONIC BILATERAL LOW BACK PAIN WITHOUT SCIATICA: ICD-10-CM

## 2024-06-05 DIAGNOSIS — G89.29 CHRONIC BILATERAL LOW BACK PAIN WITHOUT SCIATICA: ICD-10-CM

## 2024-06-05 DIAGNOSIS — E66.09 CLASS 2 OBESITY DUE TO EXCESS CALORIES WITHOUT SERIOUS COMORBIDITY WITH BODY MASS INDEX (BMI) OF 35.0 TO 35.9 IN ADULT: ICD-10-CM

## 2024-06-05 DIAGNOSIS — E78.5 DYSLIPIDEMIA: Primary | ICD-10-CM

## 2024-06-05 DIAGNOSIS — J41.1 MUCOPURULENT CHRONIC BRONCHITIS (MULTI): ICD-10-CM

## 2024-06-05 PROBLEM — E66.812 CLASS 2 OBESITY DUE TO EXCESS CALORIES WITHOUT SERIOUS COMORBIDITY WITH BODY MASS INDEX (BMI) OF 35.0 TO 35.9 IN ADULT: Status: ACTIVE | Noted: 2024-06-05

## 2024-06-05 PROCEDURE — 99214 OFFICE O/P EST MOD 30 MIN: CPT | Performed by: FAMILY MEDICINE

## 2024-06-05 PROCEDURE — 3008F BODY MASS INDEX DOCD: CPT | Performed by: FAMILY MEDICINE

## 2024-06-05 RX ORDER — MOMETASONE FUROATE AND FORMOTEROL FUMARATE DIHYDRATE 100; 5 UG/1; UG/1
2 AEROSOL RESPIRATORY (INHALATION)
Qty: 13 G | Refills: 1 | Status: SHIPPED | OUTPATIENT
Start: 2024-06-05 | End: 2025-06-05

## 2024-06-05 ASSESSMENT — PATIENT HEALTH QUESTIONNAIRE - PHQ9
SUM OF ALL RESPONSES TO PHQ9 QUESTIONS 1 AND 2: 0
2. FEELING DOWN, DEPRESSED OR HOPELESS: NOT AT ALL
1. LITTLE INTEREST OR PLEASURE IN DOING THINGS: NOT AT ALL

## 2024-06-05 NOTE — TELEPHONE ENCOUNTER
----- Message from Steve Manzo DO sent at 6/5/2024  1:46 PM EDT -----  Regarding: abnormal ct  Reviewed his calcium score it is 1600 because that I like him to see cardiology to get a second opinion as he will need some medications adjusted,,, and may be a stress test, if he has not had 1 this year,,, also was he sick during the test or he had congestion as it shows a lot of congestion and fluid in his right lung,,, if he was a bit sick we need to put him on some antibiotics but refer him to cardiology for second opinion regarding his high calcium score  ----- Message -----  From: Interface, Radiology Results In  Sent: 6/5/2024   1:18 PM EDT  To: Steve Manzo DO

## 2024-06-05 NOTE — PROGRESS NOTES
"Subjective   Patient ID: Hari Gould is a 58 y.o. male who presents for Results, Back Pain, and Neck Pain.  HPI    Pt is being seen for  CT cardiac scoring result   Pt had done on 6/1/24    Pt is having severe back pain x 2 years   Pt also having neck pain x 2 month   Pt states neck and  back are getting worse   Pt rate pain at 9/10  Pt was ibuprofen minimal relief        No other concern /question     Review of systems  ; Patient seen today for exam denies any problems with headaches or vision, denies any shortness of breath chest pain nausea or vomiting, no black stool no blood in the stool no heartburn type symptoms denies any problems with constipation or diarrhea, and no dysuria-type symptoms    The patient's allergies medications were reviewed with them today    The patient's social family and surgical history or also reviewed here today, along with her past medical history.     Objective     Alert and active in  no acute distress  HEENT TMs clear oropharynx negative nares clear no drainage noted neck supple  With no adenopathy   Heart regular rate and rhythm without murmur and no carotid bruits  Lungs- clear to auscultation bilaterally, no wheeze or rhonchi noted  Thyroid -negative masses or nodularity  Abdomen- soft times four quadrants , bowel sounds positive no masses or organomegaly, negative tenderness guarding or rebound  Neurological exam unremarkable- DTRs in upper and lower extremities within normal limits.   skin -no lesions noted      /58 (BP Location: Right arm, Patient Position: Sitting, BP Cuff Size: Large adult)   Pulse 82   Temp 36.3 °C (97.4 °F) (Temporal)   Resp 16   Ht 1.803 m (5' 11\")   Wt 114 kg (251 lb)   SpO2 96%   BMI 35.01 kg/m²     No Known Allergies    Assessment/Plan   Problem List Items Addressed This Visit       Screening PSA (prostate specific antigen)    Relevant Orders    PSA    Class 2 obesity due to excess calories without serious comorbidity with body mass " index (BMI) of 35.0 to 35.9 in adult     Other Visit Diagnoses       Dyslipidemia    -  Primary    Relevant Orders    Lipid Panel    Referral to Cardiology    BMI 35.0-35.9,adult        High coronary artery calcium score        Relevant Orders    Referral to Cardiology    Mucopurulent chronic bronchitis (Multi)        Relevant Medications    mometasone-formoterol (Dulera) 100-5 mcg/actuation inhaler    Neck pain        Relevant Orders    XR cervical spine 2-3 views    Chronic bilateral low back pain without sciatica        Relevant Orders    XR lumbar spine 2-3 views        He has had chronic neck and back problems will also get cervical spine and lumbar but because of his elevated calcium score I will get a stress test and see cardiology      Will get his lipid profile and PSA now as his insurance is better      Take a baby aspirin every day with food    Use Tylenol for any pains in his lower back I do want to use too much ibuprofen and his wife understand importance of follow-up with his anything worsens or chest pains go emergency room wants      If anything worsens or changes please call us at once, follow up in the office as planned,

## 2024-06-08 ENCOUNTER — LAB (OUTPATIENT)
Dept: LAB | Facility: LAB | Age: 59
End: 2024-06-08
Payer: MEDICAID

## 2024-06-08 DIAGNOSIS — E78.5 DYSLIPIDEMIA: ICD-10-CM

## 2024-06-08 DIAGNOSIS — Z12.5 SCREENING PSA (PROSTATE SPECIFIC ANTIGEN): ICD-10-CM

## 2024-06-08 LAB
CHOLEST SERPL-MCNC: 236 MG/DL (ref 0–199)
CHOLESTEROL/HDL RATIO: 7.1
HDLC SERPL-MCNC: 33.4 MG/DL
LDLC SERPL CALC-MCNC: 165 MG/DL
NON HDL CHOLESTEROL: 203 MG/DL (ref 0–149)
PSA SERPL-MCNC: 0.38 NG/ML
TRIGL SERPL-MCNC: 189 MG/DL (ref 0–149)
VLDL: 38 MG/DL (ref 0–40)

## 2024-06-08 PROCEDURE — 84153 ASSAY OF PSA TOTAL: CPT

## 2024-06-08 PROCEDURE — 80061 LIPID PANEL: CPT

## 2024-06-08 PROCEDURE — 36415 COLL VENOUS BLD VENIPUNCTURE: CPT

## 2024-06-11 DIAGNOSIS — E78.5 DYSLIPIDEMIA: ICD-10-CM

## 2024-06-11 NOTE — TELEPHONE ENCOUNTER
----- Message from Steve Manzo DO sent at 6/10/2024  6:12 PM EDT -----  His cholesterol is elevated and his good cholesterol is low, because of this and his calcium score that we discussed I want him to start on Crestor 10 mg 1 p.o. nightly #30 with 2 refills and recheck his lipids in 3 months

## 2024-06-12 ENCOUNTER — HOSPITAL ENCOUNTER (OUTPATIENT)
Dept: RADIOLOGY | Facility: CLINIC | Age: 59
Discharge: HOME | End: 2024-06-12
Payer: MEDICAID

## 2024-06-12 ENCOUNTER — APPOINTMENT (OUTPATIENT)
Dept: CARDIOLOGY | Facility: CLINIC | Age: 59
End: 2024-06-12
Payer: MEDICAID

## 2024-06-12 VITALS
HEIGHT: 71 IN | BODY MASS INDEX: 35.19 KG/M2 | WEIGHT: 251.4 LBS | SYSTOLIC BLOOD PRESSURE: 124 MMHG | DIASTOLIC BLOOD PRESSURE: 74 MMHG | HEART RATE: 82 BPM

## 2024-06-12 DIAGNOSIS — E78.2 MIXED HYPERLIPIDEMIA: ICD-10-CM

## 2024-06-12 DIAGNOSIS — R93.1 AGATSTON CAC SCORE, >400: Primary | ICD-10-CM

## 2024-06-12 DIAGNOSIS — R06.02 SHORTNESS OF BREATH: ICD-10-CM

## 2024-06-12 DIAGNOSIS — I49.3 PVC (PREMATURE VENTRICULAR CONTRACTION): ICD-10-CM

## 2024-06-12 DIAGNOSIS — E78.5 DYSLIPIDEMIA: ICD-10-CM

## 2024-06-12 DIAGNOSIS — M54.2 NECK PAIN: ICD-10-CM

## 2024-06-12 DIAGNOSIS — G89.29 CHRONIC BILATERAL LOW BACK PAIN WITHOUT SCIATICA: ICD-10-CM

## 2024-06-12 DIAGNOSIS — M54.50 CHRONIC BILATERAL LOW BACK PAIN WITHOUT SCIATICA: ICD-10-CM

## 2024-06-12 PROCEDURE — 72040 X-RAY EXAM NECK SPINE 2-3 VW: CPT | Performed by: RADIOLOGY

## 2024-06-12 PROCEDURE — 93000 ELECTROCARDIOGRAM COMPLETE: CPT | Performed by: INTERNAL MEDICINE

## 2024-06-12 PROCEDURE — 72100 X-RAY EXAM L-S SPINE 2/3 VWS: CPT | Performed by: RADIOLOGY

## 2024-06-12 PROCEDURE — 3008F BODY MASS INDEX DOCD: CPT | Performed by: INTERNAL MEDICINE

## 2024-06-12 PROCEDURE — 72100 X-RAY EXAM L-S SPINE 2/3 VWS: CPT

## 2024-06-12 PROCEDURE — 72040 X-RAY EXAM NECK SPINE 2-3 VW: CPT

## 2024-06-12 PROCEDURE — 99204 OFFICE O/P NEW MOD 45 MIN: CPT | Performed by: INTERNAL MEDICINE

## 2024-06-12 PROCEDURE — 4004F PT TOBACCO SCREEN RCVD TLK: CPT | Performed by: INTERNAL MEDICINE

## 2024-06-12 RX ORDER — ASPIRIN 81 MG/1
81 TABLET ORAL DAILY
COMMUNITY

## 2024-06-12 RX ORDER — ROSUVASTATIN CALCIUM 10 MG/1
10 TABLET, COATED ORAL NIGHTLY
Qty: 30 TABLET | Refills: 2 | Status: SHIPPED | OUTPATIENT
Start: 2024-06-12

## 2024-06-12 NOTE — PROGRESS NOTES
CARDIOLOGY NEW PATIENT OFFICE VISIT    Patient:  Hari Gould  YOB: 1965  MRN: [id number]       Chief Complaint:      Chief Complaint   Patient presents with    New Patient Visit       History of Present Illness:     Hari Gould is a 58 y.o. male who is being seen today at the request of Dr. Steve Manzo for evaluation of cardiac status in the setting of an elevated coronary CT calcium score.  He is accompanied by his wife.  He does not have any prior history of atherosclerotic heart or valvular heart disease.  He has a history of hyperlipidemia for which he takes rosuvastatin.  No history of hypertension or diabetes mellitus.  He has a history of chronic back and neck pain.     He has been a  for 36 years.  He admittedly is very sedentary.  He reports shortness of breath with quite minimal activities.  He denies any chest pain.  He denies any orthopnea, PND, or increasing peripheral edema.  He denies any palpitations, lightheadedness, near-syncope, or syncope.  He denies any fever, chills, or cough.  He denies any nausea, vomiting, or diaphoresis.  He denies any hemoptysis, hematemesis, melena, or hematochezia.  He smokes the equivalent of about 2 pack of cigarettes daily.  He states he rolls his own tobacco.      A coronary CT calcium score on June 1, 2024 was markedly elevated at 1662.  Lab studies dated June 8, 2024 showed a cholesterol 236, HDL 33, , and triglycerides 189.  CBC and CMP February 23, 2024 were normal.  EKG in the office today shows normal sinus rhythm with a single PVC, otherwise normal.      Allergies:     No Known Allergies       Outpatient Medications:     Current Outpatient Medications   Medication Instructions    aspirin 81 mg, oral, Daily    ibuprofen 800 mg, oral, Daily    mometasone-formoterol (Dulera) 100-5 mcg/actuation inhaler 2 puffs, inhalation, 2 times daily RT, Rinse mouth with water after use to reduce aftertaste and incidence of  candidiasis. Do not swallow.    multivitamin tablet 1 tablet, oral, Daily    rosuvastatin (CRESTOR) 10 mg, oral, Nightly        Past Medical History:     Past Medical History:   Diagnosis Date    Arthritis     Back pain     COPD (chronic obstructive pulmonary disease) (Multi)     Snores     Unspecified abnormalities of breathing 08/11/2020    Breathing problem    Unspecified visual loss 08/11/2020    Vision problems    Wears dentures     upper denture    Wears glasses     reading       Social History:     Social History     Tobacco Use    Smoking status: Every Day     Current packs/day: 1.50     Average packs/day: 1.5 packs/day for 40.0 years (60.0 ttl pk-yrs)     Types: Cigarettes    Smokeless tobacco: Current     Types: Chew   Vaping Use    Vaping status: Never Used   Substance Use Topics    Alcohol use: Yes     Comment: rarely    Drug use: Never       Family History:   No family history on file.    Review of Systems:     12 point review of systems completed and is negative other than that detailed above.       Objective:     Vitals:    06/12/24 1528   BP: 124/74   Pulse: 82       Wt Readings from Last 4 Encounters:   06/12/24 114 kg (251 lb 6.4 oz)   06/05/24 114 kg (251 lb)   04/22/24 113 kg (250 lb)   03/04/24 110 kg (242 lb 1 oz)       Physical Examination:   GENERAL:  Well developed, well nourished, in no acute distress.  CHEST:  Symmetric and nontender.  NEURO/PSYCH:  Alert and oriented times three with approppriate behavior and responses.  NECK:  Supple, no JVD, no bruit.  LUNGS:  Clear to auscultation bilaterally, normal respiratory effort.  HEART:  Rate and rhythm regular with no evident murmur, no gallop appreciated.        There are no rubs, clicks or heaves.  EXTREMITIES:  Warm with good color, no clubbing or cyanosis.  There is no edema noted.  PERIPHERAL VASCULAR:  Pulses present and equally palpable; 2+ throughout.      Lab:     CBC:   Lab Results   Component Value Date    WBC 8.6 02/23/2024    RBC  4.36 (L) 02/23/2024    HGB 14.4 02/23/2024    HCT 43.7 02/23/2024     02/23/2024        CMP:    Lab Results   Component Value Date     02/23/2024    K 5.0 02/23/2024     02/23/2024    CO2 28 02/23/2024    BUN 16 02/23/2024    CREATININE 0.81 02/23/2024    GLUCOSE 96 02/23/2024    CALCIUM 9.2 02/23/2024       Lipid Profile:    Lab Results   Component Value Date    TRIG 189 (H) 06/08/2024    HDL 33.4 06/08/2024    LDLCALC 165 (H) 06/08/2024       PT/INR:    Lab Results   Component Value Date    PROTIME 10.2 02/23/2024    INR 0.9 02/23/2024       BMP:  Lab Results   Component Value Date     02/23/2024    K 5.0 02/23/2024     02/23/2024    CO2 28 02/23/2024    BUN 16 02/23/2024    CREATININE 0.81 02/23/2024       CBC:  Lab Results   Component Value Date    WBC 8.6 02/23/2024    RBC 4.36 (L) 02/23/2024    HGB 14.4 02/23/2024    HCT 43.7 02/23/2024     02/23/2024    MCH 33.0 02/23/2024    MCHC 33.0 02/23/2024    RDW 13.2 02/23/2024     02/23/2024       Hepatic Function Panel:    Lab Results   Component Value Date    ALKPHOS 78 02/23/2024    ALT 20 02/23/2024    AST 14 02/23/2024    PROT 7.2 02/23/2024    BILITOT 0.3 02/23/2024       Diagnostic Studies:     CT cardiac scoring wo IV contrast  Result Date: 6/5/2024       1. Coronary artery calcium score of 1662.18.  2. Incompletely included peripheral ground-glass infiltrate right lower lobe. Pneumonia and pneumonitis are considerations.   Coronary artery calcium scoring may be helpful in predicting the risk for future coronary heart disease events.  According to the American College of Cardiology Foundation Clinical Expert Consensus Task Force, such testing provides important prognostic information in patients with more than one coronary heart disease risk factor.     The coronary artery calcium score correlates with the annual risk of a non-fatal myocardial infarction or coronary heart disease death.   Coronary artery score       "      Annual Risk   0-99                             0.4% 100-399                        1.3% >400                            2.4%   These three \"breakpoints\" correspond to lower, intermediate and high risk states for future coronary events.  Such information should be used, along with appropriate clinical judgment, to make decisions regarding the intensity of risk factor management strategies to treat blood lipids and to modify other non-lipid coronary risk factors.         Problem List:     Patient Active Problem List   Diagnosis    Other tear of medial meniscus, current injury, right knee, initial encounter    Other tear of lateral meniscus, current injury, right knee, initial encounter    Knee pain    BMI 34.0-34.9,adult    Screening PSA (prostate specific antigen)    Class 2 obesity due to excess calories without serious comorbidity with body mass index (BMI) of 35.0 to 35.9 in adult       Assessment:     Problem List Items Addressed This Visit             ICD-10-CM    Agatston CAC score, >400 - Primary R93.1    Shortness of breath R06.02    Relevant Orders    Nuclear Stress Test    Follow Up In Cardiology    PVC (premature ventricular contraction) I49.3    Relevant Orders    Nuclear Stress Test    Follow Up In Cardiology    Mixed hyperlipidemia E78.2     Other Visit Diagnoses         Codes    Dyslipidemia     E78.5    Relevant Orders    ECG 12 lead (Clinic Performed) (Completed)            Plan:     In light of his symptoms of exertional dyspnea as well as markedly elevated coronary CT calcium score he will be scheduled for an exercise myocardial perfusion study in the near future.    Further recommendations pending these results.    He will continue on aspirin 81 mg daily.  He is agreeable to start on rosuvastatin 10 mg daily.    He was advised to restrict sodium with an aim of no more than 2 grams per day.     He was advised on the need for regular exercise with an aim to walk at least 20-30 minutes on at " least 5 days per week.  This is assuming his stress test comes back without significant abnormalities.    -He was advised on the need to refrain from smoking.    -He was advised on the need to follow a Mediterranean style diet.    -Follow-up as scheduled.

## 2024-07-08 ENCOUNTER — APPOINTMENT (OUTPATIENT)
Dept: CARDIOLOGY | Facility: HOSPITAL | Age: 59
End: 2024-07-08
Payer: MEDICAID

## 2024-07-09 ENCOUNTER — HOSPITAL ENCOUNTER (OUTPATIENT)
Dept: CARDIOLOGY | Facility: CLINIC | Age: 59
Discharge: HOME | End: 2024-07-09
Payer: MEDICAID

## 2024-07-09 ENCOUNTER — APPOINTMENT (OUTPATIENT)
Dept: CARDIOLOGY | Facility: HOSPITAL | Age: 59
End: 2024-07-09
Payer: MEDICAID

## 2024-07-09 ENCOUNTER — HOSPITAL ENCOUNTER (OUTPATIENT)
Dept: RADIOLOGY | Facility: CLINIC | Age: 59
Discharge: HOME | End: 2024-07-09
Payer: MEDICAID

## 2024-07-09 DIAGNOSIS — R06.02 SHORTNESS OF BREATH: ICD-10-CM

## 2024-07-09 DIAGNOSIS — I49.3 PVC (PREMATURE VENTRICULAR CONTRACTION): ICD-10-CM

## 2024-07-09 PROCEDURE — 3430000001 HC RX 343 DIAGNOSTIC RADIOPHARMACEUTICALS: Performed by: INTERNAL MEDICINE

## 2024-07-09 PROCEDURE — 78452 HT MUSCLE IMAGE SPECT MULT: CPT

## 2024-07-09 PROCEDURE — A9502 TC99M TETROFOSMIN: HCPCS | Performed by: INTERNAL MEDICINE

## 2024-07-09 PROCEDURE — 93017 CV STRESS TEST TRACING ONLY: CPT

## 2024-08-22 ENCOUNTER — CLINICAL SUPPORT (OUTPATIENT)
Dept: ORTHOPEDIC SURGERY | Facility: CLINIC | Age: 59
End: 2024-08-22
Payer: MEDICAID

## 2024-08-22 ENCOUNTER — OFFICE VISIT (OUTPATIENT)
Dept: ORTHOPEDIC SURGERY | Facility: CLINIC | Age: 59
End: 2024-08-22
Payer: MEDICAID

## 2024-08-22 DIAGNOSIS — M54.42 CHRONIC BILATERAL LOW BACK PAIN WITH LEFT-SIDED SCIATICA: Primary | ICD-10-CM

## 2024-08-22 DIAGNOSIS — M54.6 MIDLINE THORACIC BACK PAIN, UNSPECIFIED CHRONICITY: ICD-10-CM

## 2024-08-22 DIAGNOSIS — G89.29 CHRONIC BILATERAL LOW BACK PAIN WITH LEFT-SIDED SCIATICA: Primary | ICD-10-CM

## 2024-08-22 DIAGNOSIS — M47.812 CERVICAL SPONDYLOSIS: ICD-10-CM

## 2024-08-22 PROCEDURE — 99204 OFFICE O/P NEW MOD 45 MIN: CPT | Performed by: PHYSICAL MEDICINE & REHABILITATION

## 2024-08-22 PROCEDURE — 4004F PT TOBACCO SCREEN RCVD TLK: CPT | Performed by: PHYSICAL MEDICINE & REHABILITATION

## 2024-08-22 RX ORDER — METHOCARBAMOL 500 MG/1
TABLET, FILM COATED ORAL
Qty: 60 TABLET | Refills: 1 | Status: SHIPPED | OUTPATIENT
Start: 2024-08-22

## 2024-08-22 RX ORDER — NAPROXEN 375 MG/1
375 TABLET ORAL
Qty: 60 TABLET | Refills: 5 | Status: SHIPPED | OUTPATIENT
Start: 2024-08-22 | End: 2025-08-22

## 2024-08-22 RX ORDER — ASPIRIN 325 MG
325 TABLET ORAL 2 TIMES DAILY PRN
Qty: 30 TABLET | Refills: 5 | Status: SHIPPED | OUTPATIENT
Start: 2024-08-22 | End: 2025-08-22

## 2024-08-22 ASSESSMENT — PAIN - FUNCTIONAL ASSESSMENT: PAIN_FUNCTIONAL_ASSESSMENT: 0-10

## 2024-08-22 ASSESSMENT — PAIN SCALES - GENERAL
PAINLEVEL_OUTOF10: 7
PAINLEVEL_OUTOF10: 9

## 2024-08-22 ASSESSMENT — PAIN DESCRIPTION - DESCRIPTORS
DESCRIPTORS: SHARP
DESCRIPTORS: SHARP
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
## 2024-09-19 ENCOUNTER — APPOINTMENT (OUTPATIENT)
Dept: ORTHOPEDIC SURGERY | Facility: CLINIC | Age: 59
End: 2024-09-19
Payer: MEDICAID

## 2024-10-01 ENCOUNTER — APPOINTMENT (OUTPATIENT)
Dept: PHYSICAL THERAPY | Facility: CLINIC | Age: 59
End: 2024-10-01
Payer: MEDICAID

## 2024-10-09 DIAGNOSIS — E78.5 DYSLIPIDEMIA: ICD-10-CM

## 2024-10-09 RX ORDER — ROSUVASTATIN CALCIUM 10 MG/1
10 TABLET, COATED ORAL NIGHTLY
Qty: 30 TABLET | Refills: 1 | Status: SHIPPED | OUTPATIENT
Start: 2024-10-09

## 2024-10-31 ENCOUNTER — APPOINTMENT (OUTPATIENT)
Dept: ORTHOPEDIC SURGERY | Facility: CLINIC | Age: 59
End: 2024-10-31
Payer: MEDICAID

## 2024-11-04 ENCOUNTER — EVALUATION (OUTPATIENT)
Dept: PHYSICAL THERAPY | Facility: CLINIC | Age: 59
End: 2024-11-04
Payer: MEDICAID

## 2024-11-04 DIAGNOSIS — G89.29 CHRONIC BILATERAL LOW BACK PAIN WITH LEFT-SIDED SCIATICA: ICD-10-CM

## 2024-11-04 DIAGNOSIS — M54.42 CHRONIC BILATERAL LOW BACK PAIN WITH LEFT-SIDED SCIATICA: ICD-10-CM

## 2024-11-04 DIAGNOSIS — M54.50 LUMBAR PAIN: Primary | ICD-10-CM

## 2024-11-04 DIAGNOSIS — M47.812 CERVICAL SPONDYLOSIS: ICD-10-CM

## 2024-11-04 DIAGNOSIS — M54.6 MIDLINE THORACIC BACK PAIN, UNSPECIFIED CHRONICITY: ICD-10-CM

## 2024-11-04 PROCEDURE — 97161 PT EVAL LOW COMPLEX 20 MIN: CPT | Mod: GP

## 2024-11-04 PROCEDURE — 97162 PT EVAL MOD COMPLEX 30 MIN: CPT | Mod: GP

## 2024-11-04 PROCEDURE — 97110 THERAPEUTIC EXERCISES: CPT | Mod: GP
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
## 2024-11-11 ENCOUNTER — TREATMENT (OUTPATIENT)
Dept: PHYSICAL THERAPY | Facility: CLINIC | Age: 59
End: 2024-11-11
Payer: MEDICAID

## 2024-11-11 DIAGNOSIS — M54.50 LUMBAR PAIN: ICD-10-CM

## 2024-11-11 DIAGNOSIS — M47.812 CERVICAL SPONDYLOSIS: ICD-10-CM

## 2024-11-11 DIAGNOSIS — M54.6 MIDLINE THORACIC BACK PAIN, UNSPECIFIED CHRONICITY: ICD-10-CM

## 2024-11-11 PROCEDURE — 97140 MANUAL THERAPY 1/> REGIONS: CPT | Mod: GP

## 2024-11-11 PROCEDURE — 97112 NEUROMUSCULAR REEDUCATION: CPT | Mod: GP

## 2024-11-11 NOTE — PROGRESS NOTES
Physical Therapy    Physical Therapy Treatment    Patient Name: Hari Gould  MRN: [id number]  Today's Date: 11/11/2024  Time Calculation  Start Time: 1605  Stop Time: 1655  Time Calculation (min): 50 min        Insurance:  Visit: #2  Authorized: 20  Certification: 11/5/2024 - 11/5/2025   Payor: [employer] / Plan: [employer] / Product Type: *No Product type* /     Subjective:  Patient reports to physical therapy with no worsening in low back pain from the initial evaluation into today's session. He has been compliant with HEP noting an increase in lumbar extension AROM compared to the initial evaluation, but also a noticeable increase in muscle spasms to the paraspinals when performing prone lumbar extensions.      Current pain: 0/10; Range: 0-7/10 over the last week    Objective:  TTP = bilat multifidi / paraspinals    Lumbar extension AROM = WNL without pain     Treatment:  Manual Therapy = 2 units  STM to bilat multifidi   PA grade 1 oscillatory mob to L2-5 TP    Neuromuscular Re-education = 1 unit  Seated forward flexion on trunk with shoulder depression = x5  Seated banded straight arm lat pulldown = 10x (blue TB)  Palloff press iso with superset     3. 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 Midline thoracic back pain, unspecified chronicity    2. Cervical spondylosis    3. Lumbar pain        Assessment:   Pt demonstrates increased sensitivity to bilateral lumbar paraspinals from the PSIS up throughout the mid thoracic spine, with noticeable increase in muscle spasms despite low grade soft tissue manipulation. Patient introduced to stability-based interventions for the spine first  with seated trunk flexion on exercise ball, requiring cueing for only moving within a pain-free range and being aware of available ranges of motion maintaining stable posture. Palloff presses also introduced with increased difficulty for patient to understand hip hinging mechanics and dissociating hips from lumbar spine.     Plan:  Continue to promote lumbar stability program.        Moises Davalos, PT

## 2024-11-18 ENCOUNTER — TREATMENT (OUTPATIENT)
Dept: PHYSICAL THERAPY | Facility: CLINIC | Age: 59
End: 2024-11-18
Payer: MEDICAID

## 2024-11-18 DIAGNOSIS — M47.812 CERVICAL SPONDYLOSIS: ICD-10-CM

## 2024-11-18 DIAGNOSIS — M54.6 MIDLINE THORACIC BACK PAIN, UNSPECIFIED CHRONICITY: ICD-10-CM

## 2024-11-18 DIAGNOSIS — M54.50 LUMBAR PAIN: ICD-10-CM

## 2024-11-18 PROCEDURE — 97112 NEUROMUSCULAR REEDUCATION: CPT | Mod: GP

## 2024-11-18 PROCEDURE — 97110 THERAPEUTIC EXERCISES: CPT | Mod: GP

## 2024-11-18 NOTE — PROGRESS NOTES
"Physical Therapy    Physical Therapy Treatment    Patient Name: Hari Gould  MRN: [id number]  Today's Date: 11/18/2024  Time Calculation  Start Time: 1612  Stop Time: 1650  Time Calculation (min): 38 min        Insurance:  Visit: #3  Authorized: 20  Certification: 11/5/2024 - 11/5/2025   Payor: [employer] / Plan: [employer] / Product Type: *No Product type* /     Subjective:  Patient reports to physical therapy with worsening of symptoms from previous session focusing mostly on STM to lumbar paraspinals and QL, specifically noting that for days he had complete return of all lower back symptoms. He did not perform any HEP due to the heightened pain levels, until he was able to return to clinic and discuss symptoms and how to manage properly without returning to prior state.     Current pain: 5-6/10; Range: constant 7-9/10 over the last week      Objective:  Pre-intervention:  Lumbar extension AROM = 25% of motion with increased pain levels         Treatment:  Therapeutic Exercise = 2 unit  Recumbent bike = 5 minutes (level 1)  Standing back bends  2x10 lower lumbar spine   2x10 upper lumbar and lower thoracic spine    Neuromuscular Re-education = 1 unit  Supine TA contraction = 3x10x3\" holds  Supine TA Contraction with glute bridge = 3x5    3. 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 Midline thoracic back pain, unspecified chronicity    2. Cervical spondylosis    3. Lumbar pain        Assessment:   Pt demonstrates " positive response to standing back bends at the beginning of session with noticeable improvement in lumbar extension AROM pre-post with reduction in pain levels. Patient challenged with supine TA contraction to further promote lumbar stability, compensation observed with abdominal/trunk flexion for superficial contraction in comparison to deep core/TA. Patient provided updated HEP with interventions performed in session that reduced pain levels and improved functional mobility by the end of session.         Plan:  Continue to promote lumbar stability program.        Moises Davalos, PT

## 2024-11-25 ENCOUNTER — TREATMENT (OUTPATIENT)
Dept: PHYSICAL THERAPY | Facility: CLINIC | Age: 59
End: 2024-11-25
Payer: MEDICAID

## 2024-11-25 DIAGNOSIS — M54.50 LUMBAR PAIN: ICD-10-CM

## 2024-11-25 DIAGNOSIS — M54.6 MIDLINE THORACIC BACK PAIN, UNSPECIFIED CHRONICITY: ICD-10-CM

## 2024-11-25 DIAGNOSIS — M47.812 CERVICAL SPONDYLOSIS: ICD-10-CM

## 2024-11-25 PROCEDURE — 97112 NEUROMUSCULAR REEDUCATION: CPT | Mod: GP

## 2024-11-25 NOTE — PROGRESS NOTES
"Physical Therapy    Physical Therapy Treatment    Patient Name: Hari Gould  MRN: [id number]  Today's Date: 11/26/2024  Time Calculation  Start Time: 1608  Stop Time: 1650  Time Calculation (min): 42 min        Insurance:  Visit: #4  Authorized: 20  Certification: 11/5/2024 - 11/5/2025   Payor: [employer] / Plan: [employer] / Product Type: *No Product type* /     Subjective:  Patient reports to physical therapy with minimal improvement in his low back pain from the previous session and initial evaluation into today. He has had a worsening of pain in his lower back and cervical spine secondary to external stresses. Patient admits that he has not been compliant with his HEP due to stress with occupation and family/holiday coming up. 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     Patient notes that when he was sitting down in chair he felt the left leg \"popped\" and then he was able to \"put it back into place\".       Current pain: 5-6/10; Range: constant 7-9/10 over the last week      Objective:  Lumbar AROM   Flexion = 25% of full motion with immediate pain         Treatment:  Neuromuscular Re-education = 3 unit  Standing lifting mechanics  Lat depression on legs (bilateral shoulder extension) with forward bending = x10  Lat depression on bolster with forward bending = x10  Seated good mornings with hip abd = x10  Shoulder/lat depression with good morning = 2x10  Supine TA contraction = 3x10x3\" holds  Supine TA Contraction with glute bridge = 3x5    2.   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 and lumbar instability   Use of pain medication as temporary solution to symptoms with prolonged improvements in pain and function coming from " active, pain-free movement patterns      OP Education: HEP: # 2 -----Access Code: [email or web address]  - Standing Lumbar Extension with Counter  - 12 x daily - 7 x weekly - 10 reps  - Seated good Morning's with no weight  - 1 x daily - 7 x weekly - 3 sets - 10 reps  - Supine Transversus Abdominis Bracing - Hands on Stomach  - 1 x daily - 7 x weekly - 3 sets - 10 reps - 3 second hold  - Supine Bridge with Gluteal Set and Spinal Articulation  - 1 x daily - 7 x weekly - 3 sets - 5 reps        Diagnosis:  1. Midline thoracic back pain, unspecified chronicity    2. Cervical spondylosis    3. Lumbar pain        Assessment:   Pt demonstrates positive response to education regarding lifting/bending mechanics in standing and sitting. With increased repetitions patient becomes aware during improper performance as a result of increased pain and symptoms in lower back, but reduction/elimination when performed correctly. For that reason patient provided HEP including seated good mornings with hip abduction isometric.          Plan:  Continue to promote lumbar stability program.        Moises Davalos, PT

## 2024-12-02 ENCOUNTER — TREATMENT (OUTPATIENT)
Dept: PHYSICAL THERAPY | Facility: CLINIC | Age: 59
End: 2024-12-02
Payer: MEDICAID

## 2024-12-02 DIAGNOSIS — M54.6 MIDLINE THORACIC BACK PAIN, UNSPECIFIED CHRONICITY: ICD-10-CM

## 2024-12-02 DIAGNOSIS — M47.812 CERVICAL SPONDYLOSIS: ICD-10-CM

## 2024-12-02 DIAGNOSIS — M54.50 LUMBAR PAIN: ICD-10-CM

## 2024-12-02 PROCEDURE — 97112 NEUROMUSCULAR REEDUCATION: CPT | Mod: GP

## 2024-12-02 NOTE — PROGRESS NOTES
Physical Therapy    Physical Therapy Treatment    Patient Name: Hari Gould  MRN: [id number]  Today's Date: 12/3/2024  Time Calculation  Start Time: 1600  Stop Time: 1649  Time Calculation (min): 49 min        Insurance:  Visit: #5  Authorized: 20  Certification: 11/5/2024 - 11/5/2025   Payor: [employer] / Plan: [employer] / Product Type: *No Product type* /     Subjective:  Patient reports to physical therapy with increased reports of right knee subluxations, occurring a couple of times over the last few weeks. He previously had surgery to address this issue which helped to reduce the pain but for some reason it has slowly returned.     In regards to low back pain, patient notes he has not seen a large improvement in symptoms since the initial evaluation. He recognizes the chronicity of low back pain and the time for recovery, but would hope for a quicker progression in symptoms and function. 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       Current pain: 5-6/10; Range: constant 7-9/10 over the last week      Objective:  Lumbar AROM   Flexion = 25% of full motion with immediate pain   Hip extension (prone)   RLE= pelvic rotates to the left and immediate lumbar extension         Treatment:  Neuromuscular Re-education = 3 unit  Supine TA contraction with swiss ball overhead reach = 3x15   Band assisted hip abd with glute bridge = 3x15   Seated lat depression on swiss ball with forward roll out = 3x15  Prone SL hip extension (RLE) = 3x5        OP Education: HEP: # 2 -----Access Code: [email or web address]  - Standing Lumbar Extension with Counter  - 12 x daily - 7 x weekly - 10 reps  - Seated good Morning's with no weight  - 1 x daily - 7 x weekly - 3 sets - 15 reps  - Supine Transversus Abdominis Bracing - Hands on Stomach  - 1 x daily - 7 x weekly - 3 sets - 12 reps - 3 second hold  - Supine Bridge with Gluteal  Set and Spinal Articulation  - 1 x daily - 7 x weekly - 3 sets - 15 reps  - Prone Hip Extension  - 1 x daily - 7 x weekly - 3 sets - 5 reps        Diagnosis:  1. Midline thoracic back pain, unspecified chronicity    2. Cervical spondylosis    3. Lumbar pain        Assessment:   Pt demonstrates improved ability to perform supine TA contraction with the addition of overhead swiss ball shoulder flexion to further challenge the deep abdominals. Patient has elimination of low back pain when using belt for hip abduction during glute bridges rather than without. Observation of prone hip extension shows that patient has compensatory pelvic rotation to the contralateral side on the right but not the left, insinuating lack of motor control and coordination.         Plan:  Continue to promote lumbar stability program.        Moises Davalos, PT

## 2024-12-08 DIAGNOSIS — E78.5 DYSLIPIDEMIA: ICD-10-CM

## 2024-12-09 RX ORDER — ROSUVASTATIN CALCIUM 10 MG/1
10 TABLET, COATED ORAL NIGHTLY
Qty: 30 TABLET | Refills: 2 | Status: SHIPPED | OUTPATIENT
Start: 2024-12-09

## 2024-12-11 ENCOUNTER — TREATMENT (OUTPATIENT)
Dept: PHYSICAL THERAPY | Facility: CLINIC | Age: 59
End: 2024-12-11
Payer: MEDICAID

## 2024-12-11 DIAGNOSIS — M54.6 MIDLINE THORACIC BACK PAIN, UNSPECIFIED CHRONICITY: ICD-10-CM

## 2024-12-11 DIAGNOSIS — M47.812 CERVICAL SPONDYLOSIS: ICD-10-CM

## 2024-12-11 DIAGNOSIS — M54.50 LUMBAR PAIN: ICD-10-CM

## 2024-12-11 PROCEDURE — 97112 NEUROMUSCULAR REEDUCATION: CPT | Mod: GP

## 2024-12-11 PROCEDURE — 97110 THERAPEUTIC EXERCISES: CPT | Mod: GP

## 2024-12-11 NOTE — PROGRESS NOTES
"Physical Therapy    Physical Therapy Treatment    Patient Name: Hari Gould  MRN: [id number]  Today's Date: 12/11/2024  Time Calculation  Start Time: 1117  Stop Time: 1203  Time Calculation (min): 46 min        Insurance:  Visit: #6  Authorized: 20  Certification: 11/5/2024 - 11/5/2025   Payor: [employer] / Plan: [employer] / Product Type: *No Product type* /     Subjective:  Patient reports to physical therapy with no worsening of low back pain since updating HEP the previous session. He notes that the difficulty and challenge with the prone hip extensions has him continuing to practice the coordination aspect of the movement in hopes of improving his lower back discomfort.     Patient notes that the reduction in symptoms today with active movements compared to previous sessions could be contributed to the timing of today's session being in the middle of the day whereas when he comes at the end of the day he already worked a full day that resulted in aching back pain.       Current pain: 5-6/10; Range: constant 7-9/10 over the last week      Objective:  Lumbar AROM   Flexion = WNL without pain  Extension = WNL without pain   Side bending= fingers to joint line bilaterally without pain          Treatment:  Neuromuscular Re-education = 1 unit  Supine TA contraction with swiss ball overhead reach = 2x20   External perturbations holding swiss ball = 3x15\" holds     2. Therapeutic Exercise = 2 unit  Seated figure 4 stretch = 30\" holds  Eliminated due to patient fear avoidance in position with recent patellar dislocation and history (ortho told him to avoid positions that create typical dislocation)   SLR (supine) with TA contraction = 2x15   Prone SL hip extension (RLE) = 2x5  Bird dog SL hip extension = 2x5x5\" hold each side       3. Patient education  Response to therapeutic interventions as it relates to chronic pain and specifically chronic back pain   Discussion " regarding motor coordination as it relates to abdominal musculature and lumbopelvic movements during functional movements  Patellar dislocations etiology and risk factors as well as usage of interventions to target the VMO for further medial pull         OP Education: HEP: # 2 -----Access Code: [email or web address]  - Standing Lumbar Extension with Counter  - 12 x daily - 7 x weekly - 10 reps  - Seated good Morning's with no weight  - 1 x daily - 7 x weekly - 3 sets - 15 reps  - Supine Transversus Abdominis Bracing - Hands on Stomach  - 1 x daily - 7 x weekly - 3 sets - 12 reps - 3 second hold  - Supine Bridge with Gluteal Set and Spinal Articulation  - 1 x daily - 7 x weekly - 3 sets - 15 reps  - Prone Hip Extension  - 1 x daily - 7 x weekly - 3 sets - 5 reps        Diagnosis:  1. Midline thoracic back pain, unspecified chronicity    2. Cervical spondylosis    3. Lumbar pain        Assessment:   Pt provided updated HEP in session incorporating SLR and LE only bird dogs with an isometric for motor control and coordination of abdominal musculature with appendicular movements. Patient tolerates interventions well in session without provoking further low back or knee pain post.         Plan:  Continue to promote lumbar stability program.        Moises Davalos, PT

## 2024-12-19 ENCOUNTER — APPOINTMENT (OUTPATIENT)
Dept: ORTHOPEDIC SURGERY | Facility: CLINIC | Age: 59
End: 2024-12-19
Payer: MEDICAID

## 2024-12-19 DIAGNOSIS — M47.812 CERVICAL SPONDYLOSIS: ICD-10-CM

## 2024-12-19 DIAGNOSIS — M54.42 CHRONIC BILATERAL LOW BACK PAIN WITH LEFT-SIDED SCIATICA: Primary | ICD-10-CM

## 2024-12-19 DIAGNOSIS — M54.6 MIDLINE THORACIC BACK PAIN, UNSPECIFIED CHRONICITY: ICD-10-CM

## 2024-12-19 DIAGNOSIS — G89.29 CHRONIC BILATERAL LOW BACK PAIN WITH LEFT-SIDED SCIATICA: Primary | ICD-10-CM

## 2024-12-19 PROCEDURE — 99213 OFFICE O/P EST LOW 20 MIN: CPT | Performed by: PHYSICAL MEDICINE & REHABILITATION

## 2024-12-19 RX ORDER — NAPROXEN 375 MG/1
375 TABLET ORAL
Qty: 60 TABLET | Refills: 5 | Status: SHIPPED | OUTPATIENT
Start: 2024-12-19 | End: 2025-12-19

## 2024-12-19 RX ORDER — METHOCARBAMOL 500 MG/1
TABLET, FILM COATED ORAL
Qty: 60 TABLET | Refills: 1 | Status: SHIPPED | OUTPATIENT
Start: 2024-12-19

## 2024-12-19 SDOH — SOCIAL STABILITY: SOCIAL NETWORK: SOCIAL ACTIVITY:: 7
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
## 2025-01-08 ENCOUNTER — APPOINTMENT (OUTPATIENT)
Dept: PHYSICAL THERAPY | Facility: CLINIC | Age: 60
End: 2025-01-08

## 2025-01-15 ENCOUNTER — APPOINTMENT (OUTPATIENT)
Dept: PHYSICAL THERAPY | Facility: CLINIC | Age: 60
End: 2025-01-15

## 2025-01-22 ENCOUNTER — APPOINTMENT (OUTPATIENT)
Dept: PHYSICAL THERAPY | Facility: CLINIC | Age: 60
End: 2025-01-22

## 2025-01-29 ENCOUNTER — APPOINTMENT (OUTPATIENT)
Dept: PHYSICAL THERAPY | Facility: CLINIC | Age: 60
End: 2025-01-29

## 2025-01-29 DIAGNOSIS — M54.6 MIDLINE THORACIC BACK PAIN, UNSPECIFIED CHRONICITY: ICD-10-CM

## 2025-01-29 DIAGNOSIS — G89.29 CHRONIC BILATERAL LOW BACK PAIN WITH LEFT-SIDED SCIATICA: ICD-10-CM

## 2025-01-29 DIAGNOSIS — M47.812 CERVICAL SPONDYLOSIS: ICD-10-CM

## 2025-01-29 DIAGNOSIS — M54.42 CHRONIC BILATERAL LOW BACK PAIN WITH LEFT-SIDED SCIATICA: ICD-10-CM

## 2025-01-29 RX ORDER — METHOCARBAMOL 500 MG/1
TABLET, FILM COATED ORAL
Qty: 60 TABLET | Refills: 1 | Status: SHIPPED | OUTPATIENT
Start: 2025-01-29

## 2025-02-05 ENCOUNTER — APPOINTMENT (OUTPATIENT)
Dept: PHYSICAL THERAPY | Facility: CLINIC | Age: 60
End: 2025-02-05

## 2025-02-12 ENCOUNTER — APPOINTMENT (OUTPATIENT)
Dept: PHYSICAL THERAPY | Facility: CLINIC | Age: 60
End: 2025-02-12

## 2025-02-19 ENCOUNTER — APPOINTMENT (OUTPATIENT)
Dept: PHYSICAL THERAPY | Facility: CLINIC | Age: 60
End: 2025-02-19

## 2025-02-26 ENCOUNTER — APPOINTMENT (OUTPATIENT)
Dept: PHYSICAL THERAPY | Facility: CLINIC | Age: 60
End: 2025-02-26

## 2025-03-13 ENCOUNTER — APPOINTMENT (OUTPATIENT)
Dept: ORTHOPEDIC SURGERY | Facility: CLINIC | Age: 60
End: 2025-03-13
Payer: MEDICAID

## 2025-03-27 DIAGNOSIS — M54.6 MIDLINE THORACIC BACK PAIN, UNSPECIFIED CHRONICITY: ICD-10-CM

## 2025-03-27 DIAGNOSIS — M54.42 CHRONIC BILATERAL LOW BACK PAIN WITH LEFT-SIDED SCIATICA: ICD-10-CM

## 2025-03-27 DIAGNOSIS — M47.812 CERVICAL SPONDYLOSIS: ICD-10-CM

## 2025-03-27 DIAGNOSIS — G89.29 CHRONIC BILATERAL LOW BACK PAIN WITH LEFT-SIDED SCIATICA: ICD-10-CM

## 2025-03-27 RX ORDER — METHOCARBAMOL 500 MG/1
TABLET, FILM COATED ORAL
Qty: 60 TABLET | Refills: 1 | Status: SHIPPED | OUTPATIENT
Start: 2025-03-27

## 2025-08-20 DIAGNOSIS — M47.812 CERVICAL SPONDYLOSIS: ICD-10-CM

## 2025-08-20 DIAGNOSIS — M54.42 CHRONIC BILATERAL LOW BACK PAIN WITH LEFT-SIDED SCIATICA: ICD-10-CM

## 2025-08-20 DIAGNOSIS — M54.6 MIDLINE THORACIC BACK PAIN, UNSPECIFIED CHRONICITY: ICD-10-CM

## 2025-08-20 DIAGNOSIS — G89.29 CHRONIC BILATERAL LOW BACK PAIN WITH LEFT-SIDED SCIATICA: ICD-10-CM

## 2025-08-20 RX ORDER — NAPROXEN 375 MG/1
375 TABLET ORAL
Qty: 60 TABLET | Refills: 5 | Status: SHIPPED | OUTPATIENT
Start: 2025-08-20 | End: 2026-08-20

## (undated) DEVICE — TUBING, PATIENT 8FT STERILE

## (undated) DEVICE — TUBING, SUCTION, 6MM X 10, CLEAN N-COND

## (undated) DEVICE — DRESSING, NON-ADHERENT, OIL EMULSION, CURITY, 3 X 8 IN, STERILE

## (undated) DEVICE — TUBING, PUMP REDEUCE 8FT STERILE

## (undated) DEVICE — SOLUTION, IRRIGATION, USP, SODIUM CHLORIDE 0.9%, 3000 ML

## (undated) DEVICE — Device

## (undated) DEVICE — DRESSING, ABDOMINAL PAD, CURITY, 7.5 X 8 IN

## (undated) DEVICE — GLOVE, SURGICAL, PROTEXIS PI , 7.5, PF, LF

## (undated) DEVICE — PREP, IODOPHOR, W/ALCOHOL, DURAPREP, W/APPLICATOR, 26 CC

## (undated) DEVICE — SUTURE, ETHILON, 4-0, BLK, MONO, PS-2 18

## (undated) DEVICE — MAT, FLOOR, STANDARD FLUID BARRIER, 32X44, GREEN

## (undated) DEVICE — STRAP, ARM BOARD, 32 X 1.5

## (undated) DEVICE — STRAP, VELCRO, BODY, 4 X 60IN, NS

## (undated) DEVICE — MANIFOLD, 4 PORT NEPTUNE STANDARD

## (undated) DEVICE — PADDING, CAST, SPECIALIST, 6 IN X 4 YD, STERILE

## (undated) DEVICE — PROBE, SERFAS, 3.5MM, 50 S, ENERGY SUCTION SYSTEM